# Patient Record
Sex: FEMALE | Race: WHITE | NOT HISPANIC OR LATINO | Employment: FULL TIME | URBAN - METROPOLITAN AREA
[De-identification: names, ages, dates, MRNs, and addresses within clinical notes are randomized per-mention and may not be internally consistent; named-entity substitution may affect disease eponyms.]

---

## 2017-01-10 ENCOUNTER — TELEPHONE (OUTPATIENT)
Dept: HEMATOLOGY/ONCOLOGY | Facility: CLINIC | Age: 47
End: 2017-01-10

## 2017-01-10 NOTE — TELEPHONE ENCOUNTER
----- Message from Conner Lucas MD sent at 1/9/2017  5:02 PM CST -----  Contact: self  No she does not  ----- Message -----     From: Mariaa Silva     Sent: 1/9/2017   2:48 PM       To: Conner Lucas MD        ----- Message -----     From: Catalina Velasco     Sent: 1/9/2017   2:10 PM       To: Shayy Prieto Staff    Pt is calling to speak with a nurse in regards to her having her ovaries removed on 1/23. She is wondering if she will need her injection on 1/19.    Contact number is [phone]    Called pt and she said ok

## 2017-01-12 ENCOUNTER — TELEPHONE (OUTPATIENT)
Dept: HEMATOLOGY/ONCOLOGY | Facility: CLINIC | Age: 47
End: 2017-01-12

## 2017-01-12 NOTE — TELEPHONE ENCOUNTER
----- Message from Catalina Velasco sent at 1/11/2017  4:28 PM CST -----  Contact: self  Pt is calling to speak with a nurse in regards to why her apt on 1/19 was cancelled with  bc she does want to see him.    Contact number is [phone]

## 2017-01-19 ENCOUNTER — OFFICE VISIT (OUTPATIENT)
Dept: RADIATION ONCOLOGY | Facility: CLINIC | Age: 47
End: 2017-01-19
Payer: MEDICAID

## 2017-01-19 ENCOUNTER — HOSPITAL ENCOUNTER (OUTPATIENT)
Dept: RADIOLOGY | Facility: CLINIC | Age: 47
Discharge: HOME | End: 2017-01-19
Attending: INTERNAL MEDICINE
Payer: MEDICAID

## 2017-01-19 VITALS
DIASTOLIC BLOOD PRESSURE: 71 MMHG | HEIGHT: 69 IN | BODY MASS INDEX: 39.32 KG/M2 | HEART RATE: 82 BPM | WEIGHT: 265.5 LBS | TEMPERATURE: 97 F | SYSTOLIC BLOOD PRESSURE: 118 MMHG | RESPIRATION RATE: 14 BRPM

## 2017-01-19 DIAGNOSIS — M85.80 OSTEOPENIA: ICD-10-CM

## 2017-01-19 DIAGNOSIS — C50.912 PRIMARY CANCER OF LEFT BREAST WITH STAGE 3 NODAL METASTASIS PER AMERICAN JOINT COMMITTEE ON CANCER 7TH EDITION (N3): Primary | ICD-10-CM

## 2017-01-19 DIAGNOSIS — C77.9 PRIMARY CANCER OF LEFT BREAST WITH STAGE 3 NODAL METASTASIS PER AMERICAN JOINT COMMITTEE ON CANCER 7TH EDITION (N3): Primary | ICD-10-CM

## 2017-01-19 PROCEDURE — 99213 OFFICE O/P EST LOW 20 MIN: CPT | Mod: PBBFAC | Performed by: RADIOLOGY

## 2017-01-19 PROCEDURE — 77080 DXA BONE DENSITY AXIAL: CPT | Mod: 26,,, | Performed by: INTERNAL MEDICINE

## 2017-01-19 PROCEDURE — 99999 PR PBB SHADOW E&M-EST. PATIENT-LVL III: CPT | Mod: PBBFAC,,, | Performed by: RADIOLOGY

## 2017-01-19 PROCEDURE — 99213 OFFICE O/P EST LOW 20 MIN: CPT | Mod: S$PBB,,, | Performed by: RADIOLOGY

## 2017-01-19 RX ORDER — GABAPENTIN 300 MG/1
300 CAPSULE ORAL EVERY 8 HOURS PRN
Refills: 2 | COMMUNITY
Start: 2016-12-26 | End: 2018-02-23

## 2017-01-19 NOTE — PROGRESS NOTES
REFERRING PHYSICIAN: Brady Baeza MD    DIAGNOSIS:   Multifocal p T1c N2a M0 Invasive ductal carcinoma of the left breast    Radiation Treatment Summary:  Ms. Reese completed radiation to the left breast mound, left internal mammary nodes, and left supra clavicular region as shown below.    Treatment Site Energy Dose/Fx (Gy) #Fx Dose Correction (Gy) Total Dose (Gy) Start Date End Date   LT SCLAV 6X 1.8 28 / 28 0 50.4 10/31/2016 12/9/2016   LT BREAST MOUND/ LT IM 18X/6X 1.8 28 / 28 0 50.4 10/31/2016 12/9/2016     INTERVAL HISTORY:   Ms. Reese is a 46 year old female who was recently diagnosed with left breast cancer after evaluation for serous nipple discharge. On 2/29/2016, she underwent left terminal end nipple duct excision with pathology revealing ductal carcinoma insitu, solid type, grade 1, measuring 9 mm in greatest dimension, with the closest margin at less than 1 mm from the peripheral inked area. On 4/27/2016, she underwent bilateral mastectomy and WAN reconstruction. The pathology revealed the left breast with multifocal invasive ductal carcinoma with the largest lesion measuring 11 mm in the subcutaneous areolar region (additional lesions: 5 mm upper outer quadrant, 3 mm lower inner quadrant). The closest margin is at greater than 10 mm. There was lymphovascular invasion noted. 4 out of 6 sentinel lymph nodes were found to have involvement. On additional axillary dissection, one out of 15 lymph nodes were also involved. This lesion is ER positive (greater than 90%), IA positive (greater than 90%), and HER-2 negative. The pathology from the right breast revealed a 1 mm focus of ADH. She received adjuvant chemotherapy. To reduce her chance of loco regional recurrence, she received post operative radiation to the left breast mound and draining lymph nodes including the left internal mammary and left supraclavicular nodes as above.  She is here today for a routine follow-up.      She's currently on Arimidex  "and is planned for bilateral oophorectomy early next week.  At present, she denies left breast pain, edema, erythema, fever, night sweats, or weight loss.  She reports some mild soreness of the left arm after doing yoga last week.    PHYSICAL EXAMINATION:  VITAL SINGS:   Visit Vitals    /71    Pulse 82    Temp 96.8 °F (36 °C) (Oral)    Resp 14    Ht 5' 9" (1.753 m)    Wt 120.4 kg (265 lb 8 oz)    BMI 39.21 kg/m2     GENERAL: Patient is alert and oriented, in no acute distress.  HEENT: Extraocular muscles are intact.  Oropharynx is clear without lesions.  There is no cervical or supraclavicular adenopathy palpated.    CHEST: Breath sounds clear bilaterally.  No rales.  No rhonchi.  Unlabored respirations.  CARDIOVASCULAR: Normal S1, S2.  Normal rate.  Regular rhythm.  BREAST EXAM: There is hyperpigmentation of the left breast mound noted with some dryness around the nipple area.  The left breast mound is smaller than the right.  Bilateral breast mounds with well-healed scar in the central region secondary to reconstruction. No abnormal masses palpated in the bilateral breast mounds or the bilateral axilla.   ABDOMEN: Bowel sounds normal.  No tenderness.  No abdominal distention.  No hepatomegaly.  No splenomegaly.  EXTREMITIES: No clubbing, cyanosis, edema  NEUROLOGIC: Cranial nerves II through XII are grossly intact.  Sensation is intact.  Strength is 5 out of 5 in the upper and lower extremities bilaterally.    ASSESSMENT:   This is a 46-year-old female with multifocal p T1 CN2 a M0 infiltrating ductal carcinoma of the left breast who underwent bilateral mastectomy and WAN reconstruction with 5 out of 21 lymph nodes positive who completed chemotherapy followed by postoperative radiation, for a lesion which is ER/SD positive and HER-2 negative.    PLAN:   Ms. Reese is recovering from the radiation without any unexpected side effects.  Skin care to the left breast mound was again reviewed with the " patient.  She will continue endocrine therapy as planned.  I plan to see her back in approximately 6 months, unless symptoms warrant otherwise.

## 2017-02-13 ENCOUNTER — TELEPHONE (OUTPATIENT)
Dept: HEMATOLOGY/ONCOLOGY | Facility: CLINIC | Age: 47
End: 2017-02-13

## 2017-02-13 NOTE — TELEPHONE ENCOUNTER
----- Message from Conner Lucas MD sent at 2/13/2017  4:09 PM CST -----  Contact: self  If she wants to be seen, I will be happy to see her  ----- Message -----     From: Mariaa Silva     Sent: 2/13/2017   3:06 PM       To: Conner Lucas MD    Do you want to see her tomorrow   ----- Message -----     From: Severino Valdes     Sent: 2/13/2017   9:56 AM       To: Shayy Prieto Staff    Pt states she has bumps on left breast where cancer was, pt has some concerns and would like to discuss with nurse, pt not sure if she should she  or .  Contact number [phone]

## 2017-02-16 ENCOUNTER — TELEPHONE (OUTPATIENT)
Dept: SURGERY | Facility: CLINIC | Age: 47
End: 2017-02-16

## 2017-02-21 ENCOUNTER — OFFICE VISIT (OUTPATIENT)
Dept: SURGERY | Facility: CLINIC | Age: 47
End: 2017-02-21
Payer: MEDICAID

## 2017-02-21 ENCOUNTER — TELEPHONE (OUTPATIENT)
Dept: SURGERY | Facility: CLINIC | Age: 47
End: 2017-02-21

## 2017-02-21 VITALS — BODY MASS INDEX: 39.97 KG/M2 | TEMPERATURE: 98 F | WEIGHT: 269.88 LBS | HEIGHT: 69 IN

## 2017-02-21 DIAGNOSIS — I89.0 LYMPHEDEMA: Primary | ICD-10-CM

## 2017-02-21 DIAGNOSIS — C77.9 PRIMARY CANCER OF LEFT BREAST WITH STAGE 3 NODAL METASTASIS PER AMERICAN JOINT COMMITTEE ON CANCER 7TH EDITION (N3): ICD-10-CM

## 2017-02-21 DIAGNOSIS — C50.912 PRIMARY CANCER OF LEFT BREAST WITH STAGE 3 NODAL METASTASIS PER AMERICAN JOINT COMMITTEE ON CANCER 7TH EDITION (N3): ICD-10-CM

## 2017-02-21 PROCEDURE — 99999 PR PBB SHADOW E&M-EST. PATIENT-LVL III: CPT | Mod: PBBFAC,,, | Performed by: SURGERY

## 2017-02-21 PROCEDURE — 99213 OFFICE O/P EST LOW 20 MIN: CPT | Mod: PBBFAC,PO | Performed by: SURGERY

## 2017-02-21 PROCEDURE — 99214 OFFICE O/P EST MOD 30 MIN: CPT | Mod: S$PBB,,, | Performed by: SURGERY

## 2017-02-21 RX ORDER — IBUPROFEN 600 MG/1
TABLET ORAL
Refills: 0 | COMMUNITY
Start: 2017-01-23 | End: 2018-02-23

## 2017-02-21 RX ORDER — VENLAFAXINE 75 MG/1
TABLET ORAL
Refills: 0 | COMMUNITY
Start: 2017-01-30 | End: 2018-02-23

## 2017-02-21 NOTE — PROGRESS NOTES
Subjective:       Patient ID: Allegra Reese is a 46 y.o. female.    Chief Complaint: Follow-up (Bumps on left Breast /Hx Left Breast Cancer)    HPI 47 yo W with a history of multifocal pT1c N2a M0 (stage III) infiltrating ductal carcinoma of the left breast who underwent bilateral SSM and WAN reconstruction with 3 lesions with the largest one measuring 11 mm and a total of 5 out of 21 lymph nodes with involvement. This lesion was ER/HI positive and HER-2 negative and she completed adjuvant chemotherapy on October 7, 2016. She completed adjuvant radiation therapy on 12/9/2016.    She presents today with concerns for new palpable superficial skin nodules that she first noticed about 2 weeks ago. Her gynecologist prescribed her topical steroids and since then her skin is much improved and she can barely feel any nodules. She denies any palpable axillary changes. She notes some continued discomfort in her axilla but that is improving. She has noted some swelling of LUE but she continues to do arm exercises. She has not been evaluated by physical therapy yet. She has a follow up with plastic surgery to finish her reconstruction.    Review of Systems   Constitutional: Negative for activity change, appetite change, fever and unexpected weight change.   Respiratory: Negative for apnea, shortness of breath and wheezing.    Cardiovascular: Negative for chest pain and palpitations.   Gastrointestinal: Negative for abdominal distention, abdominal pain, constipation and nausea.   Endocrine: Negative.    Genitourinary: Negative.    Musculoskeletal: Negative.    Skin: Positive for color change and rash.   Neurological: Negative.    Hematological: Negative.        Objective:      Physical Exam   Constitutional: She is oriented to person, place, and time. She appears well-developed and well-nourished. No distress.   HENT:   Head: Normocephalic and atraumatic.   Eyes: EOM are normal. Pupils are equal, round, and reactive to  light.   Cardiovascular: Normal rate, regular rhythm and normal heart sounds.    Pulmonary/Chest: Effort normal and breath sounds normal.       1. Well-healed incisions of reconstructed breasts; well-healed former port site on the R chest wall    2. Firm 2cm nodule in right axillary tail, likely fat necrosis   Abdominal: Soft. Bowel sounds are normal. She exhibits no distension. There is no tenderness.   Musculoskeletal: Normal range of motion.   Neurological: She is alert and oriented to person, place, and time.   Skin: Skin is warm and dry.   Radiation skin changes of the left breast and axilla   Psychiatric: She has a normal mood and affect. Her behavior is normal. Judgment and thought content normal.       Assessment:       1. Primary cancer of left breast with stage 3 nelson metastasis per American Joint Committee on Cancer 7th edition (N3)        Plan:       Follow up in 6 months for clinical breast exam and follow up  Prescription given for outpatient PT  RTC with concerns    Suleman Louise MD  PGY-3  [phone] (pager)    I have personally taken the history and examined this patient and agree with the resident's note as stated above.  SON  No suspicious clinical findings.  Hyperpigmentation of left reconstructed breast and chest wall.  No suspicious masses, skin changes, or LAD.  Right reconstructed breast with about 2 cm area of fat necrosis in UOQ axillary tail.  Pt with slight lymphedema of LUE.  No limited ROM at left shoulder.  Pt pending NAC reconstruction 3 rd stage and NAC tattooing.  Pt with some numbness along left posterior upper arm likely related to ICBN related surgical issues.    SON  Refer to outpatient PT/OT for LUE lymphedema.  F/u with me clinically in 6 months for bilateral clinical chest wall examination as part of ongoing breast CA screening and surveillance.

## 2017-02-21 NOTE — TELEPHONE ENCOUNTER
Spoke with pt, she will call back with the fax number to send OT  Order to [facility] for lymphedema

## 2017-02-21 NOTE — LETTER
[facility]  [address]  [facility] [address]  Phone: [phone] February 21, 2017      Devorah Acosta MD  [address] [facility] [place] [address]    Patient: Allegra Reese   MR Number: [id number]   YOB: 1970   Date of Visit: 2/21/2017     Dear Dr. Acosta:    I saw your patient Allegra Reese in my Breast Surgery Clinic. Below are the relevant portions of my assessment and plan of care.    Allegra is a 46-year-old female with a history of multifocal pT1c N2a M0 (stage III) infiltrating ductal carcinoma of the left breast who underwent bilateral SSM and WAN reconstruction with 3 lesions with the largest one measuring 11 mm and a total of 5 out of 21 lymph nodes with involvement.  This lesion was ER/CT positive and HER-2 negative and she completed adjuvant chemotherapy on October 7, 2016. She completed adjuvant radiation therapy on 12/9/2016.     She presents today with concerns for new palpable superficial skin nodules that she first noticed about 2 weeks ago. Her gynecologist prescribed her topical steroids and since then her skin is much improved and she can barely feel any nodules. She denies any palpable axillary changes. She notes some continued discomfort in her axilla but that is improving. She has noted some swelling of LUE but she continues to do arm exercises. She has not been evaluated by physical therapy yet. She has a follow up with plastic surgery to finish her reconstruction.    SON.  No suspicious clinical findings.  Hyperpigmentation of left reconstructed breast and chest wall.  No suspicious masses, skin changes, or LAD. Right reconstructed breast with about 2 cm area of fat necrosis in UOQ axillary tail.  Patient with slight lymphedema of LUE.  No limited ROM at left shoulder.  Patient pending NAC reconstruction 3rd stage and NAC tattooing.  Patient with some numbness along left posterior upper arm likely related to ICBN related surgical  issues.    Refer to outpatient PT/OT for LUE lymphedema.  F/U with me clinically in 6 months for bilateral clinical chest wall examination as part of ongoing breast CA screening and surveillance.    If you have questions, please do not hesitate to call me. I look forward to following Allegra along with you.    Sincerely,        Brady Baeza MD   Medical Director, [facility]  Section of General and Oncologic Surgery  [facility]    RLC/hcr    CC  MD Maria D Torres MD Alireza Sadeghi, MD

## 2017-02-21 NOTE — TELEPHONE ENCOUNTER
----- Message from Segundo Dukes sent at 2/21/2017  2:14 PM CST -----  Patient states that she needs to speak with nurse in ref to physical therapy order//please call back at [phone]//thank you

## 2017-03-10 ENCOUNTER — OFFICE VISIT (OUTPATIENT)
Dept: HEMATOLOGY/ONCOLOGY | Facility: CLINIC | Age: 47
End: 2017-03-10
Payer: COMMERCIAL

## 2017-03-10 VITALS
DIASTOLIC BLOOD PRESSURE: 90 MMHG | BODY MASS INDEX: 39.39 KG/M2 | HEART RATE: 101 BPM | WEIGHT: 266.75 LBS | SYSTOLIC BLOOD PRESSURE: 138 MMHG | RESPIRATION RATE: 15 BRPM | OXYGEN SATURATION: 100 % | TEMPERATURE: 98 F

## 2017-03-10 DIAGNOSIS — Z79.811 USE OF AROMATASE INHIBITORS: ICD-10-CM

## 2017-03-10 DIAGNOSIS — C50.412 MALIGNANT NEOPLASM OF UPPER-OUTER QUADRANT OF LEFT FEMALE BREAST: Primary | ICD-10-CM

## 2017-03-10 PROCEDURE — 99214 OFFICE O/P EST MOD 30 MIN: CPT | Mod: S$PBB,,, | Performed by: INTERNAL MEDICINE

## 2017-03-10 PROCEDURE — 99213 OFFICE O/P EST LOW 20 MIN: CPT | Mod: PBBFAC | Performed by: INTERNAL MEDICINE

## 2017-03-10 PROCEDURE — 99999 PR PBB SHADOW E&M-EST. PATIENT-LVL III: CPT | Mod: PBBFAC,,, | Performed by: INTERNAL MEDICINE

## 2017-03-10 NOTE — PROGRESS NOTES
Subjective:       Patient ID: Allegra Reese is a 46 y.o. female.    Chief Complaint: No chief complaint on file.    HPI   Mrs. Reese returns today for follow up.  She has now completed her XRT and in late January she underwent a laparoscopic oophorectomy.  She remain on AIs.   Briefly, she is a 46-year-old  female from [place] who was diagnosed with breast cancer last year, and on 04/27/2016 underwent bilateral mastectomies with reconstruction.   There was no invasive cancer or DCIS in the right breast; in the left breast she had three areas of an infiltrating ductal carcinoma measuring 11 mm, 5 mm, and 3 mm respectively.  One sentinel lymph node was positive and a full axillary dissection was performed with a total of five lymph nodes being positive.  She was tested and found to be negative for BRCA mutations.  Of note, her cancer was strongly ER and AR positive and HER 2 megative  She started adjuvant chemotherapy on June 24th, and completed 4 cycles of AC and four cycles of paclitaxel.  XRT was started last fall, and she was also started on zoladex plus anastrazole..      Review of Systems    Overall today she feels OK.  She has fully recovered from her surgery.  Her  ECOG PS remains 1.  She denies any easy bruising, fevers, chills, night  sweats, weight loss, nausea, vomiting, diarrhea, constipation, diplopia, blurred vision, headaches, chest pain, palpitations, shortness of breath, breast pain, upper extremity pain, or difficulty ambulating.  The remainder of the ten-point ROS, including general, skin, lymph, H/N, cardiorespiratory, GI, , Neuro, Endocrine, and psychiatric is negative.   Of note, she underwent removal of her port earlier today.    Objective:      Physical Exam    She is alert, oriented to time, place, person, pleasant, well      nourished, in no acute physical distress.                                    VITAL SIGNS:  Reviewed                                      HEENT:  normal.  There are no nasal, oral, lip, gingival, auricular, lid,    or conjunctival lesions.  Mucosae are moist and pink, and there is no        thrush.  Pupils are equal, reactive to light and accommodation.              Extraocular muscle movements are intact.    Dentition is good.                                     NECK:  Supple without JVD, adenopathy, or thyromegaly.                       LUNGS:  Clear to auscultation without wheezing, rales, or rhonchi.           CARDIOVASCULAR:  Reveals an S1, S2, no murmurs, no rubs, no gallops.         ABDOMEN:  Soft, nontender, without organomegaly.  Bowel sounds are    present.   The abdominal incision has healed.                                                                 EXTREMITIES:  No cyanosis, clubbing, or edema.                               BREASTS:  She is status post bilateral mastectomies with free flap reconstructions.  The incisions have healed nicely.   Her port has been removed.                                   LYMPHATIC:  There is no cervical, axillary, or supraclavicular adenopathy.   SKIN:  Warm and moist, without petechiae, rashes, induration, or ecchymoses.  There is hyperpigmentation within the radiation field on the left chest wall.         NEUROLOGIC:  DTRs are 0-1+ bilaterally, symmetrical, motor function is 5/5,  and cranial nerves are  within normal limits.    Assessment:       1. Malignant neoplasm of upper-outer quadrant of left female breast    2. Use of aromatase inhibitors     3.      Neuropathy, imrpoving  Plan:        Mrs. Reese will remain on AIs through December 2021, and see me again in three months.  She was advised to take calcium and vitamin D for osteoporosis prevention.  Her questions were answered to her satisfaction.

## 2017-03-22 ENCOUNTER — TELEPHONE (OUTPATIENT)
Dept: SURGERY | Facility: CLINIC | Age: 47
End: 2017-03-22

## 2017-03-22 DIAGNOSIS — I89.0 LYMPHEDEMA: Primary | ICD-10-CM

## 2017-03-22 NOTE — TELEPHONE ENCOUNTER
----- Message from Minerva Xavier sent at 3/22/2017  3:48 PM CDT -----  Contact: Self   Allegra States that she needs a call returned by a nurse in reference to her therapy for her arm. Please call Allegra @ [phone]. Thanks :)

## 2017-05-16 ENCOUNTER — TELEPHONE (OUTPATIENT)
Dept: HEMATOLOGY/ONCOLOGY | Facility: CLINIC | Age: 47
End: 2017-05-16

## 2017-06-15 ENCOUNTER — OFFICE VISIT (OUTPATIENT)
Dept: HEMATOLOGY/ONCOLOGY | Facility: CLINIC | Age: 47
End: 2017-06-15
Payer: MEDICAID

## 2017-06-15 DIAGNOSIS — D05.12 DUCTAL CARCINOMA IN SITU (DCIS) OF LEFT BREAST: ICD-10-CM

## 2017-06-15 DIAGNOSIS — Z79.811 USE OF AROMATASE INHIBITORS: ICD-10-CM

## 2017-06-15 DIAGNOSIS — I26.99 OTHER ACUTE PULMONARY EMBOLISM: ICD-10-CM

## 2017-06-15 DIAGNOSIS — C50.912 MALIGNANT NEOPLASM OF LEFT FEMALE BREAST, UNSPECIFIED SITE OF BREAST: Primary | ICD-10-CM

## 2017-06-15 PROCEDURE — 99999 PR PBB SHADOW E&M-EST. PATIENT-LVL I: CPT | Mod: PBBFAC,,, | Performed by: INTERNAL MEDICINE

## 2017-06-15 PROCEDURE — 99215 OFFICE O/P EST HI 40 MIN: CPT | Mod: S$GLB,,, | Performed by: INTERNAL MEDICINE

## 2017-06-15 PROCEDURE — 99211 OFF/OP EST MAY X REQ PHY/QHP: CPT | Mod: PBBFAC | Performed by: INTERNAL MEDICINE

## 2017-06-15 NOTE — PROGRESS NOTES
Subjective:       Patient ID: Allegra Reese is a 47 y.o. female.    Chief Complaint: No chief complaint on file.    HPI   Mrs. Reese returns today for follow up.  She has now completed her XRT and in late January she underwent a laparoscopic oophorectomy.  She remain on AIs.   Briefly, she is a 46-year-old  female from [place] who was diagnosed with breast cancer last year, and on 04/27/2016 underwent bilateral mastectomies with reconstruction.   There was no invasive cancer or DCIS in the right breast; in the left breast she had three areas of an infiltrating ductal carcinoma measuring 11 mm, 5 mm, and 3 mm respectively.  One sentinel lymph node was positive and a full axillary dissection was performed with a total of five lymph nodes being positive.  She was tested and found to be negative for BRCA mutations.  Of note, her cancer was strongly ER and LA positive and HER 2 megative  She started adjuvant chemotherapy on June 24th, and completed 4 cycles of AC and four cycles of paclitaxel.  XRT was started last fall, and she was also started on zoladex plus anastrazole..    Apparently she underwent a reconstruction on April 29, 2017, and two weeks alter she presented to the ED with SOB and was found to have PEs.  She was started on rivaroxaban, and was told to take it for 6 months.  She is currently on 20 mg QHS.       Review of Systems    Overall today she feels OK.  She has fully recovered from her surgery and her PE.  Her  ECOG PS remains 1.  She does complain of joint pains, presumably from the anastrazole, but she denies any easy bruising, fevers, chills, night  sweats, weight loss, nausea, vomiting, diarrhea, constipation, diplopia, blurred vision, headaches, chest pain, palpitations, shortness of breath, breast pain, upper extremity pain, or difficulty ambulating.  The remainder of the ten-point ROS, including general, skin, lymph, H/N, cardiorespiratory, GI, , Neuro, Endocrine, and  psychiatric is negative.   Of note, she underwent removal of her port earlier today.    Objective:      Physical Exam    She is alert, oriented to time, place, person, pleasant, well      nourished, in no acute physical distress.                                    VITAL SIGNS:  Reviewed                                      HEENT: normal.  There are no nasal, oral, lip, gingival, auricular, lid,    or conjunctival lesions.  Mucosae are moist and pink, and there is no        thrush.  Pupils are equal, reactive to light and accommodation.              Extraocular muscle movements are intact.    Dentition is good.                                     NECK:  Supple without JVD, adenopathy, or thyromegaly.                       LUNGS:  Clear to auscultation without wheezing, rales, or rhonchi.           CARDIOVASCULAR:  Reveals an S1, S2, no murmurs, no rubs, no gallops.         ABDOMEN:  Soft, nontender, without organomegaly.  Bowel sounds are    present.   The abdominal incision has healed.                                                                 EXTREMITIES:  No cyanosis, clubbing, or edema.                               BREASTS:  She is status post bilateral mastectomies with free flap reconstructions.  The incisions have healed nicely.                                LYMPHATIC:  There is no cervical, axillary, or supraclavicular adenopathy.   SKIN:  Warm and moist, without petechiae, rashes, induration, or ecchymoses.  There is hyperpigmentation within the radiation field on the left chest wall.         NEUROLOGIC:  DTRs are 0-1+ bilaterally, symmetrical, motor function is 5/5,  and cranial nerves are  within normal limits.    Assessment:       1. Malignant neoplasm of left female breast, unspecified site of breast    2. Ductal carcinoma in situ (DCIS) of left breast    3. Use of aromatase inhibitors     3.      PE, possibly related to the administration of AIs.   Plan:        Mrs. Reese will remain on AIs  through December 2021, and see me again in three months.  In regards to her recent PEs, I recommended that she remain on rivaroxaban for the whole time that she is on AIs.   Her questions were answered to her satisfaction.

## 2017-08-23 ENCOUNTER — OFFICE VISIT (OUTPATIENT)
Dept: RADIATION ONCOLOGY | Facility: CLINIC | Age: 47
End: 2017-08-23
Payer: COMMERCIAL

## 2017-08-23 VITALS
WEIGHT: 258.31 LBS | HEIGHT: 69 IN | SYSTOLIC BLOOD PRESSURE: 134 MMHG | BODY MASS INDEX: 38.26 KG/M2 | HEART RATE: 79 BPM | RESPIRATION RATE: 16 BRPM | TEMPERATURE: 98 F | DIASTOLIC BLOOD PRESSURE: 73 MMHG

## 2017-08-23 DIAGNOSIS — Z17.0 MALIGNANT NEOPLASM OF AREOLA OF LEFT BREAST IN FEMALE, ESTROGEN RECEPTOR POSITIVE: Primary | ICD-10-CM

## 2017-08-23 DIAGNOSIS — C50.012 MALIGNANT NEOPLASM OF AREOLA OF LEFT BREAST IN FEMALE, ESTROGEN RECEPTOR POSITIVE: Primary | ICD-10-CM

## 2017-08-23 PROCEDURE — 3075F SYST BP GE 130 - 139MM HG: CPT | Mod: S$GLB,,, | Performed by: RADIOLOGY

## 2017-08-23 PROCEDURE — 3008F BODY MASS INDEX DOCD: CPT | Mod: S$GLB,,, | Performed by: RADIOLOGY

## 2017-08-23 PROCEDURE — 3078F DIAST BP <80 MM HG: CPT | Mod: S$GLB,,, | Performed by: RADIOLOGY

## 2017-08-23 PROCEDURE — 99213 OFFICE O/P EST LOW 20 MIN: CPT | Mod: S$GLB,,, | Performed by: RADIOLOGY

## 2017-08-23 PROCEDURE — 99999 PR PBB SHADOW E&M-EST. PATIENT-LVL III: CPT | Mod: PBBFAC,,, | Performed by: RADIOLOGY

## 2017-08-23 RX ORDER — METOPROLOL SUCCINATE 25 MG/1
TABLET, EXTENDED RELEASE ORAL
Refills: 6 | COMMUNITY
Start: 2017-08-08

## 2017-08-23 RX ORDER — RIVAROXABAN 20 MG/1
20 TABLET, FILM COATED ORAL DAILY
Refills: 12 | COMMUNITY
Start: 2017-08-15

## 2017-08-23 RX ORDER — PYRIDOXINE HCL (VITAMIN B6) 100 MG
100 TABLET ORAL DAILY
COMMUNITY
End: 2018-02-23

## 2017-08-23 NOTE — PROGRESS NOTES
REFERRING PHYSICIAN: Brady Baeza MD     DIAGNOSIS:   Multifocal p T1c N2a M0 Invasive ductal carcinoma of the left breast     Radiation Treatment Summary:  Ms. Reese completed radiation to the left breast mound, left internal mammary nodes, and left supra clavicular region as shown below.     Treatment Site Energy Dose/Fx (Gy) #Fx Total Dose (Gy) Start Date End Date   LT SCLAV 6X 1.8 28 / 28 50.4 10/31/2016 12/9/2016   LT BREAST MOUND/ LT IM 18X/6X 1.8 28 / 28 50.4 10/31/2016 12/9/2016      INTERVAL HISTORY:   Ms. Reese is a 47 year old female who was recently diagnosed with left breast cancer after evaluation for serous nipple discharge. On 2/29/2016, she underwent left terminal end nipple duct excision with pathology revealing ductal carcinoma insitu, solid type, grade 1, measuring 9 mm in greatest dimension, with the closest margin at less than 1 mm from the peripheral inked area. On 4/27/2016, she underwent bilateral mastectomy and WAN reconstruction. The pathology revealed the left breast with multifocal invasive ductal carcinoma with the largest lesion measuring 11 mm in the subcutaneous areolar region (additional lesions: 5 mm upper outer quadrant, 3 mm lower inner quadrant). The closest margin is at greater than 10 mm. There was lymphovascular invasion noted. 4 out of 6 sentinel lymph nodes were found to have involvement. On additional axillary dissection, one out of 15 lymph nodes were also involved. This lesion is ER positive (greater than 90%), [place] positive (greater than 90%), and HER-2 negative. The pathology from the right breast revealed a 1 mm focus of ADH. She received adjuvant chemotherapy. To reduce her chance of loco regional recurrence, she received post operative radiation to the left breast mound and draining lymph nodes including the left internal mammary and left supraclavicular nodes as above.  She is here today for a routine follow-up.       Since I saw her last, she underwent bilateral  "oophorectomy and revision of the bilateral breast mounds.  She was also diagnosed with PE and is currently on anticoagulation.  She's currently on Arimidex which she is tolerating.  At present, she denies left breast pain, edema, erythema, fever, night sweats, or weight loss.  .     PHYSICAL EXAMINATION:  VITAL SINGS: /73   Pulse 79   Temp 97.7 °F (36.5 °C) (Oral)   Resp 16   Ht 5' 9" (1.753 m)   Wt 117.2 kg (258 lb 4.8 oz)   BMI 38.14 kg/m²   GENERAL: Patient is alert and oriented, in no acute distress.  HEENT: Extraocular muscles are intact.  Oropharynx is clear without lesions.  There is no cervical or supraclavicular adenopathy palpated.    CHEST: Breath sounds clear bilaterally.  No rales.  No rhonchi.  Unlabored respirations.  CARDIOVASCULAR: Normal S1, S2.  Normal rate.  Regular rhythm.  BREAST EXAM: There is hyperpigmentation of the left breast mound noted with some dryness around the nipple area.  The left breast mound is smaller than the right.  Bilateral breast mounds with well-healed scar in the central region secondary to reconstruction. No abnormal masses palpated in the bilateral breast mounds or the bilateral axilla.   ABDOMEN: Bowel sounds normal.  No tenderness.  No abdominal distention.  No hepatomegaly.  No splenomegaly.  EXTREMITIES: No clubbing, cyanosis, edema  NEUROLOGIC: Cranial nerves II through XII are grossly intact.  Sensation is intact.  Strength is 5 out of 5 in the upper and lower extremities bilaterally.    ASSESSMENT:   This is a 47-year-old female with multifocal p T1 CN2 a M0 infiltrating ductal carcinoma of the left breast who underwent bilateral mastectomy and WAN reconstruction with 5 out of 21 lymph nodes positive who completed chemotherapy followed by postoperative radiation, for a lesion which is ER/CA positive and HER-2 negative.     PLAN:   Ms. Reese is doing well from a standpoint of left breast cancer.  She will continue endocrine therapy as planned per Dr." Jie.  I plan to see her back as needed, unless symptoms warrant otherwise.

## 2017-09-19 ENCOUNTER — OFFICE VISIT (OUTPATIENT)
Dept: SURGERY | Facility: CLINIC | Age: 47
End: 2017-09-19
Payer: COMMERCIAL

## 2017-09-19 VITALS
WEIGHT: 257.81 LBS | HEIGHT: 69 IN | BODY MASS INDEX: 38.18 KG/M2 | HEART RATE: 79 BPM | DIASTOLIC BLOOD PRESSURE: 89 MMHG | SYSTOLIC BLOOD PRESSURE: 132 MMHG | TEMPERATURE: 99 F

## 2017-09-19 DIAGNOSIS — C50.912 PRIMARY CANCER OF LEFT BREAST WITH STAGE 3 NODAL METASTASIS PER AMERICAN JOINT COMMITTEE ON CANCER 7TH EDITION (N3): Primary | ICD-10-CM

## 2017-09-19 DIAGNOSIS — C77.9 PRIMARY CANCER OF LEFT BREAST WITH STAGE 3 NODAL METASTASIS PER AMERICAN JOINT COMMITTEE ON CANCER 7TH EDITION (N3): Primary | ICD-10-CM

## 2017-09-19 PROCEDURE — 99999 PR PBB SHADOW E&M-EST. PATIENT-LVL III: CPT | Mod: PBBFAC,,, | Performed by: SURGERY

## 2017-09-19 PROCEDURE — 3008F BODY MASS INDEX DOCD: CPT | Mod: S$GLB,,, | Performed by: SURGERY

## 2017-09-19 PROCEDURE — 3075F SYST BP GE 130 - 139MM HG: CPT | Mod: S$GLB,,, | Performed by: SURGERY

## 2017-09-19 PROCEDURE — 3079F DIAST BP 80-89 MM HG: CPT | Mod: S$GLB,,, | Performed by: SURGERY

## 2017-09-19 PROCEDURE — 99214 OFFICE O/P EST MOD 30 MIN: CPT | Mod: S$GLB,,, | Performed by: SURGERY
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
## 2017-09-25 NOTE — PROGRESS NOTES
Subjective:       Patient ID: Allegra Reese is a 47 y.o. female.    Chief Complaint: No chief complaint on file.    HPI   Mrs. Reese returns today for follow up.  She completed her XRT and in late January 2017 she underwent a laparoscopic oophorectomy.  She remain on AIs.   Briefly, she is a 47-year-old  female from [place] who was diagnosed with breast cancer last year, and on 04/27/2016 underwent bilateral mastectomies with reconstruction.   There was no invasive cancer or DCIS in the right breast; in the left breast she had three areas of an infiltrating ductal carcinoma measuring 11 mm, 5 mm, and 3 mm respectively.  One sentinel lymph node was positive and a full axillary dissection was performed with a total of five lymph nodes being positive.  She was tested and found to be negative for BRCA mutations.  Of note, her cancer was strongly ER and IA positive and HER 2 megative  She started adjuvant chemotherapy on June 24th, and completed 4 cycles of AC and four cycles of paclitaxel.  XRT was started last fall, and she was also started on zoladex plus anastrazole..    Apparently she underwent a reconstruction on April 29, 2017, and two weeks alter she presented to the ED with SOB and was found to have PEs.  She was started on rivaroxaban, and is currently on 20 mg QHS.       Review of Systems    Overall today she feels OK.  She has fully recovered from her surgery and her PE.  Her  ECOG PS remains 1.  She does complain of joint pains, presumably from the anastrazole, but she denies any easy bruising, fevers, chills, night  sweats, weight loss, nausea, vomiting, diarrhea, constipation, diplopia, blurred vision, headaches, chest pain, palpitations, shortness of breath, breast pain, upper extremity pain, or difficulty ambulating.  The remainder of the ten-point ROS, including general, skin, lymph, H/N, cardiorespiratory, GI, , Neuro, Endocrine, and psychiatric is negative.   Of note, she underwent  removal of her port earlier today.    Objective:      Physical Exam    She is alert, oriented to time, place, person, pleasant, well      nourished, in no acute physical distress.                                    VITAL SIGNS:  Reviewed                                      HEENT: normal.  There are no nasal, oral, lip, gingival, auricular, lid,    or conjunctival lesions.  Mucosae are moist and pink, and there is no        thrush.  Pupils are equal, reactive to light and accommodation.              Extraocular muscle movements are intact.    Dentition is good.                                     NECK:  Supple without JVD, adenopathy, or thyromegaly.                       LUNGS:  Clear to auscultation without wheezing, rales, or rhonchi.           CARDIOVASCULAR:  Reveals an S1, S2, no murmurs, no rubs, no gallops.         ABDOMEN:  Soft, nontender, without organomegaly.  Bowel sounds are    present.   The abdominal incision has healed.                                                                 EXTREMITIES:  No cyanosis, clubbing, or edema.                               BREASTS:  She is status post bilateral mastectomies with free flap reconstructions.  The incisions have healed nicely.  There is a 2 cm hard nodule at the 9 o' clock position in the right reconstructed breast, possibly representing fat necrosis.                            LYMPHATIC:  There is no cervical, axillary, or supraclavicular adenopathy.   SKIN:  Warm and moist, without petechiae, rashes, induration, or ecchymoses.  There is mild hyperpigmentation within the radiation field on the left chest wall.         NEUROLOGIC:  DTRs are 0-1+ bilaterally, symmetrical, motor function is 5/5,  and cranial nerves are  within normal limits.    Assessment:       1. Ductal carcinoma in situ (DCIS) of breast, unspecified laterality    2. Malignant neoplasm of upper-outer quadrant of left breast in female, estrogen receptor positive    3. Use of aromatase  inhibitors     3.     PE, possibly related to the administration of AIs.   5.      Probable fat necrosis, right reconstructed breast  Plan:        In regards to her breast nodule, I suspect it is fat necrosis.  However, out of abundance of caution we will proceed with a right sided mammogram.  Mrs. Reese will remain on AIs through December 2021, and see me again in three months.  In regards to her recent PE, I recommended that she remain on rivaroxaban for the whole time that she is on AIs.   Her questions were answered to her satisfaction.

## 2017-09-26 ENCOUNTER — HOSPITAL ENCOUNTER (OUTPATIENT)
Dept: RADIOLOGY | Facility: HOSPITAL | Age: 47
Discharge: HOME | End: 2017-09-26
Attending: INTERNAL MEDICINE
Payer: COMMERCIAL

## 2017-09-26 ENCOUNTER — OFFICE VISIT (OUTPATIENT)
Dept: SURGERY | Facility: CLINIC | Age: 47
End: 2017-09-26
Payer: COMMERCIAL

## 2017-09-26 VITALS
WEIGHT: 257 LBS | DIASTOLIC BLOOD PRESSURE: 93 MMHG | TEMPERATURE: 98 F | HEIGHT: 69 IN | SYSTOLIC BLOOD PRESSURE: 133 MMHG | BODY MASS INDEX: 38.06 KG/M2 | HEART RATE: 95 BPM

## 2017-09-26 DIAGNOSIS — Z17.0 MALIGNANT NEOPLASM OF UPPER-OUTER QUADRANT OF LEFT BREAST IN FEMALE, ESTROGEN RECEPTOR POSITIVE: ICD-10-CM

## 2017-09-26 DIAGNOSIS — C50.412 MALIGNANT NEOPLASM OF UPPER-OUTER QUADRANT OF LEFT BREAST IN FEMALE, ESTROGEN RECEPTOR POSITIVE: ICD-10-CM

## 2017-09-26 DIAGNOSIS — Z79.811 USE OF AROMATASE INHIBITORS: ICD-10-CM

## 2017-09-26 DIAGNOSIS — D05.10 DUCTAL CARCINOMA IN SITU (DCIS) OF BREAST, UNSPECIFIED LATERALITY: Primary | ICD-10-CM

## 2017-09-26 DIAGNOSIS — D05.10 DUCTAL CARCINOMA IN SITU (DCIS) OF BREAST, UNSPECIFIED LATERALITY: ICD-10-CM

## 2017-09-26 PROCEDURE — 3008F BODY MASS INDEX DOCD: CPT | Mod: S$GLB,,, | Performed by: INTERNAL MEDICINE

## 2017-09-26 PROCEDURE — 3080F DIAST BP >= 90 MM HG: CPT | Mod: S$GLB,,, | Performed by: INTERNAL MEDICINE

## 2017-09-26 PROCEDURE — 99214 OFFICE O/P EST MOD 30 MIN: CPT | Mod: S$GLB,,, | Performed by: INTERNAL MEDICINE

## 2017-09-26 PROCEDURE — 3075F SYST BP GE 130 - 139MM HG: CPT | Mod: S$GLB,,, | Performed by: INTERNAL MEDICINE

## 2017-09-26 PROCEDURE — 99999 PR PBB SHADOW E&M-EST. PATIENT-LVL IV: CPT | Mod: PBBFAC,,, | Performed by: INTERNAL MEDICINE

## 2017-09-26 PROCEDURE — 77065 DX MAMMO INCL CAD UNI: CPT | Mod: 26,,, | Performed by: RADIOLOGY

## 2017-09-26 PROCEDURE — 77061 BREAST TOMOSYNTHESIS UNI: CPT | Mod: TC

## 2017-09-26 PROCEDURE — 77061 BREAST TOMOSYNTHESIS UNI: CPT | Mod: 26,,, | Performed by: RADIOLOGY

## 2017-09-26 RX ORDER — CEPHALEXIN 500 MG/1
CAPSULE ORAL
Refills: 0 | COMMUNITY
Start: 2017-09-07 | End: 2018-02-23

## 2017-09-26 RX ORDER — SULFAMETHOXAZOLE AND TRIMETHOPRIM 800; 160 MG/1; MG/1
TABLET ORAL
Refills: 0 | COMMUNITY
Start: 2017-09-22 | End: 2018-02-23

## 2017-10-27 ENCOUNTER — TELEPHONE (OUTPATIENT)
Dept: HEMATOLOGY/ONCOLOGY | Facility: CLINIC | Age: 47
End: 2017-10-27

## 2017-10-27 NOTE — TELEPHONE ENCOUNTER
Returned call, spoke with patient and assisted with scheduling a f/u apt. Patient stated she was recently in the ER and was told to schedule a hospital f/u

## 2017-10-27 NOTE — TELEPHONE ENCOUNTER
----- Message from Joselo Valdes sent at 10/26/2017 11:56 AM CDT -----  Contact: Pt   Pt will like to schedule an appt w/ Dr. Lloyd regarding a certain issue     Contact::[phone]

## 2017-10-31 ENCOUNTER — OFFICE VISIT (OUTPATIENT)
Dept: HEMATOLOGY/ONCOLOGY | Facility: CLINIC | Age: 47
End: 2017-10-31
Payer: COMMERCIAL

## 2017-10-31 VITALS
WEIGHT: 260 LBS | SYSTOLIC BLOOD PRESSURE: 178 MMHG | HEART RATE: 99 BPM | BODY MASS INDEX: 41.78 KG/M2 | DIASTOLIC BLOOD PRESSURE: 100 MMHG | TEMPERATURE: 98 F | RESPIRATION RATE: 18 BRPM | HEIGHT: 66 IN

## 2017-10-31 DIAGNOSIS — Z17.0 MALIGNANT NEOPLASM OF NIPPLE OF LEFT BREAST IN FEMALE, ESTROGEN RECEPTOR POSITIVE: Primary | ICD-10-CM

## 2017-10-31 DIAGNOSIS — Z79.811 USE OF AROMATASE INHIBITORS: ICD-10-CM

## 2017-10-31 DIAGNOSIS — C50.012 MALIGNANT NEOPLASM OF NIPPLE OF LEFT BREAST IN FEMALE, ESTROGEN RECEPTOR POSITIVE: Primary | ICD-10-CM

## 2017-10-31 PROCEDURE — 99214 OFFICE O/P EST MOD 30 MIN: CPT | Mod: S$GLB,,, | Performed by: INTERNAL MEDICINE

## 2017-10-31 PROCEDURE — 99999 PR PBB SHADOW E&M-EST. PATIENT-LVL I: CPT | Mod: PBBFAC,,, | Performed by: INTERNAL MEDICINE

## 2017-10-31 NOTE — PROGRESS NOTES
Subjective:       Patient ID: Allegra Reese is a 47 y.o. female.    Chief Complaint: No chief complaint on file.    HPI   Mrs. Reese returns today for follow up.  She completed her XRT and in late January 2017 she underwent a laparoscopic oophorectomy.  She remain on AIs.   Briefly, she is a 47-year-old  female from [place] who was diagnosed with breast cancer last year, and on 04/27/2016 underwent bilateral mastectomies with reconstruction.   There was no invasive cancer or DCIS in the right breast; in the left breast she had three areas of an infiltrating ductal carcinoma measuring 11 mm, 5 mm, and 3 mm respectively.  One sentinel lymph node was positive and a full axillary dissection was performed with a total of five lymph nodes being positive.  She was tested and found to be negative for BRCA mutations.  Of note, her cancer was strongly ER and SC positive and HER 2 megative  She started adjuvant chemotherapy on June 24th, and completed 4 cycles of AC and four cycles of paclitaxel.  XRT was started last fall, and she was also started on zoladex plus anastrazole..    Apparently she underwent a reconstruction on April 29, 2017, and two weeks alter she presented to the ED with SOB and was found to have PEs.  She was started on rivaroxaban, and is currently on 20 mg QHS.       Review of Systems    Overall today she feels OK.  She has fully recovered from her surgery and her PE.  Her  ECOG PS remains 1.  She again complains of joint pains, involving primarily her thumbs and her hands, presumably from the anastrazole, but she denies any easy bruising, fevers, chills, night  sweats, weight loss, nausea, vomiting, diarrhea, constipation, diplopia, blurred vision, headaches, chest pain, palpitations, shortness of breath, breast pain, upper extremity pain, or difficulty ambulating.  The remainder of the ten-point ROS, including general, skin, lymph, H/N, cardiorespiratory, GI, , Neuro, Endocrine, and  psychiatric is negative.   Of note, she underwent removal of her port earlier today.    Objective:      Physical Exam    She is alert, oriented to time, place, person, pleasant, well      nourished, in no acute physical distress.                                    VITAL SIGNS:  Reviewed                                      HEENT: normal.  There are no nasal, oral, lip, gingival, auricular, lid,    or conjunctival lesions.  Mucosae are moist and pink, and there is no        thrush.  Pupils are equal, reactive to light and accommodation.              Extraocular muscle movements are intact.    Dentition is good.                                     NECK:  Supple without JVD, adenopathy, or thyromegaly.                       LUNGS:  Clear to auscultation without wheezing, rales, or rhonchi.           CARDIOVASCULAR:  Reveals an S1, S2, no murmurs, no rubs, no gallops.         ABDOMEN:  Soft, nontender, without organomegaly.  Bowel sounds are    present.   The abdominal incision has healed.                                                                 EXTREMITIES:  No cyanosis, clubbing, or edema.                               BREASTS:  She is status post bilateral mastectomies with free flap reconstructions.  The incisions have healed nicely.  There is a 2 cm hard nodule at the 9 o' clock position in the right reconstructed breast, representing fat necrosis.                            LYMPHATIC:  There is no cervical, axillary, or supraclavicular adenopathy.   SKIN:  Warm and moist, without petechiae, rashes, induration, or ecchymoses.  There is mild hyperpigmentation within the radiation field on the left chest wall.         NEUROLOGIC:  DTRs are 0-1+ bilaterally, symmetrical, motor function is 5/5,  and cranial nerves are  within normal limits.    Assessment:       1. Malignant neoplasm of nipple of left breast in female, estrogen receptor positive    2. Use of aromatase inhibitors     3.     PE, possibly related to  the administration of AIs.   4.      Fat necrosis, right reconstructed breast\  5.      Thumb and hand joint pains bilaterally secondary to AIs.   Plan:          I had a long discussion with her; we discussed the options of examestane and also of tamoxifen.  The pros and cons of both of the above agents were discussed in detail.  At the end, she elected to remain on anastrazole for now.  She will complete her 5 years in December 2021.  RTC 3 months.  Her multiple questions were answered to her satisfaction.  In regards to her recent PE, I recommended that she remain on rivaroxaban for the whole time that she is on AIs.   Her questions were answered to her satisfaction.

## 2017-12-07 DIAGNOSIS — Z79.811 USE OF AROMATASE INHIBITORS: ICD-10-CM

## 2017-12-07 RX ORDER — ANASTROZOLE 1 MG/1
TABLET ORAL
Qty: 90 TABLET | Refills: 0 | Status: CANCELLED | OUTPATIENT
Start: 2017-12-07

## 2017-12-09 DIAGNOSIS — Z79.811 USE OF AROMATASE INHIBITORS: ICD-10-CM

## 2017-12-09 RX ORDER — ANASTROZOLE 1 MG/1
1 TABLET ORAL DAILY
Qty: 90 TABLET | Refills: 2 | Status: SHIPPED | OUTPATIENT
Start: 2017-12-09 | End: 2018-09-26 | Stop reason: SDUPTHER

## 2017-12-11 DIAGNOSIS — Z79.811 USE OF AROMATASE INHIBITORS: ICD-10-CM

## 2017-12-11 RX ORDER — ANASTROZOLE 1 MG/1
1 TABLET ORAL DAILY
Qty: 90 TABLET | Refills: 2 | OUTPATIENT
Start: 2017-12-11 | End: 2018-12-11

## 2018-01-19 ENCOUNTER — OFFICE VISIT (OUTPATIENT)
Dept: URGENT CARE | Facility: CLINIC | Age: 48
End: 2018-01-19
Payer: MEDICAID

## 2018-01-19 VITALS
BODY MASS INDEX: 39.55 KG/M2 | WEIGHT: 267 LBS | RESPIRATION RATE: 20 BRPM | HEIGHT: 69 IN | SYSTOLIC BLOOD PRESSURE: 141 MMHG | HEART RATE: 86 BPM | OXYGEN SATURATION: 97 % | TEMPERATURE: 98 F | DIASTOLIC BLOOD PRESSURE: 97 MMHG

## 2018-01-19 DIAGNOSIS — M77.32 HEEL SPUR, LEFT: Primary | ICD-10-CM

## 2018-01-19 DIAGNOSIS — M79.672 LEFT FOOT PAIN: ICD-10-CM

## 2018-01-19 PROCEDURE — 99204 OFFICE O/P NEW MOD 45 MIN: CPT | Mod: S$GLB,,, | Performed by: FAMILY MEDICINE

## 2018-01-19 RX ORDER — TRAMADOL HYDROCHLORIDE 50 MG/1
50 TABLET ORAL EVERY 6 HOURS PRN
Qty: 20 TABLET | Refills: 0 | Status: SHIPPED | OUTPATIENT
Start: 2018-01-19 | End: 2019-01-19

## 2018-01-19 NOTE — PROGRESS NOTES
"Subjective:       Patient ID: Allegra Reese is a 47 y.o. female.    Vitals:  height is 5' 9" (1.753 m) and weight is 121.1 kg (267 lb). Her oral temperature is 98 °F (36.7 °C). Her blood pressure is 141/97 (abnormal) and her pulse is 86. Her respiration is 20 and oxygen saturation is 97%.     Chief Complaint: Foot Pain    Foot Pain   This is a new problem. The current episode started in the past 7 days. The problem occurs constantly. The problem has been unchanged. Associated symptoms include joint swelling. Pertinent negatives include no abdominal pain, chest pain, neck pain, numbness or weakness. Nothing aggravates the symptoms. She has tried nothing for the symptoms.     Review of Systems   Constitution: Negative for weakness and malaise/fatigue.   HENT: Negative for nosebleeds.    Cardiovascular: Negative for chest pain and syncope.   Respiratory: Negative for shortness of breath.    Musculoskeletal: Positive for joint pain and joint swelling. Negative for back pain and neck pain.   Gastrointestinal: Negative for abdominal pain.   Genitourinary: Negative for hematuria.   Neurological: Negative for dizziness and numbness.       Objective:      Physical Exam   Constitutional: She is oriented to person, place, and time. She appears well-developed and well-nourished. She is cooperative.  Non-toxic appearance. She does not appear ill. No distress.   HENT:   Head: Normocephalic and atraumatic. Head is without abrasion, without contusion and without laceration.   Right Ear: Hearing, tympanic membrane, external ear and ear canal normal. No hemotympanum.   Left Ear: Hearing, tympanic membrane, external ear and ear canal normal. No hemotympanum.   Nose: Nose normal. No mucosal edema, rhinorrhea or nasal deformity. No epistaxis. Right sinus exhibits no maxillary sinus tenderness and no frontal sinus tenderness. Left sinus exhibits no maxillary sinus tenderness and no frontal sinus tenderness.   Mouth/Throat: Uvula " is midline, oropharynx is clear and moist and mucous membranes are normal. No trismus in the jaw. Normal dentition. No uvula swelling. No posterior oropharyngeal erythema.   Eyes: Conjunctivae, EOM and lids are normal. Pupils are equal, round, and reactive to light. Right eye exhibits no discharge. Left eye exhibits no discharge. No scleral icterus.   Sclera clear bilat   Neck: Trachea normal, normal range of motion, full passive range of motion without pain and phonation normal. Neck supple. No spinous process tenderness and no muscular tenderness present. No neck rigidity. No tracheal deviation present.   Cardiovascular: Normal rate, regular rhythm, normal heart sounds, intact distal pulses and normal pulses.    Pulmonary/Chest: Effort normal and breath sounds normal. No respiratory distress.   Abdominal: Soft. Normal appearance and bowel sounds are normal. She exhibits no distension, no pulsatile midline mass and no mass. There is no tenderness.   Musculoskeletal: Normal range of motion. She exhibits no edema or deformity.        Left foot: There is tenderness and bony tenderness.        Feet:    Neurological: She is alert and oriented to person, place, and time. She has normal strength. No cranial nerve deficit or sensory deficit. She exhibits normal muscle tone. She displays no seizure activity. Coordination normal. GCS eye subscore is 4. GCS verbal subscore is 5. GCS motor subscore is 6.   Skin: Skin is warm, dry and intact. Capillary refill takes less than 2 seconds. No abrasion, no bruising, no burn, no ecchymosis and no laceration noted. She is not diaphoretic. No pallor.   Psychiatric: She has a normal mood and affect. Her speech is normal and behavior is normal. Judgment and thought content normal. Cognition and memory are normal.   Nursing note and vitals reviewed.    Type of Interpretation: ED Physician (Independently Interpreted).  Radiology Procedure Done: Left Foot.  Interpretation: Heel spur,  otherwise wnl.      Assessment:       1. Heel spur, left    2. Left foot pain        Plan:         Heel spur, left    Left foot pain  -     X-Ray Foot Complete Left; Future; Expected date: 01/19/2018    Other orders  -     traMADol (ULTRAM) 50 mg tablet; Take 1 tablet (50 mg total) by mouth every 6 (six) hours as needed for Pain.  Dispense: 20 tablet; Refill: 0      Please drink plenty of fluids.  Please get plenty of rest.  Please return here or go to the Emergency Department for any concerns or worsening of condition.    If you were prescribed a narcotic medication, do not drive or operate heavy equipment or machinery while taking these medications.    Use a Dr. Gary's pad with heel area removed to relieve pressure on heel.  If crutches were recommended, use them for touch down ambulation to take weight off the affected foot.      If you were not prescribed an anti-inflammatory medication, and if you do not have any history of stomach/intestinal ulcers, or kidney disease, or are not taking a blood thinner such as Coumadin, Plavix, Pradaxa, Eloquis, or Xaralta for example, it is OK to take over the counter Ibuprofen or Advil or Motrin or Aleve as directed.  Do not take these medications on an empty stomach.  If not allergic, please take over the counter Tylenol (Acetaminophen) and/or Motrin (Ibuprofen) as directed for control of pain and/or fever.    Please follow up with your primary care doctor or specialist as needed.  Devorah Acosta MD  [phone]    Podiatry - Carmelina Combs  [address] [place] [address].  Kasandra Cosme  [id number]  [phone]

## 2018-01-19 NOTE — LETTER
January 19, 2018  Allegra Reese  [address] [place] [id number]                [facility]  [address], [address]  [facility] [address]  Phone: [phone]  Fax: [phone] Allegra Reese was seen and treated in our Urgent Care department   on 1/19/2018. She may return to work in 2 - 3 days.      If you have any questions or concerns, please don't hesitate to call.    Sincerely,        Eladio Davis MD

## 2018-01-19 NOTE — PATIENT INSTRUCTIONS
Please drink plenty of fluids.  Please get plenty of rest.  Please return here or go to the Emergency Department for any concerns or worsening of condition.    If you were prescribed a narcotic medication, do not drive or operate heavy equipment or machinery while taking these medications.    Use a Dr. Gary's pad with heel area removed to relieve pressure on heel.  If crutches were recommended, use them for touch down ambulation to take weight off the affected foot.      If you were not prescribed an anti-inflammatory medication, and if you do not have any history of stomach/intestinal ulcers, or kidney disease, or are not taking a blood thinner such as Coumadin, Plavix, Pradaxa, Eloquis, or Xaralta for example, it is OK to take over the counter Ibuprofen or Advil or Motrin or Aleve as directed.  Do not take these medications on an empty stomach.  If not allergic, please take over the counter Tylenol (Acetaminophen) and/or Motrin (Ibuprofen) as directed for control of pain and/or fever.    Please follow up with your primary care doctor or specialist as needed.  Devorah Acosta MD  [phone]    Podiatry - [place]    Dr. Angelo Combs  [address] [place] [address].  [place]  [address]  [phone]      Heel Spurs    The plantar fascia is a thick, fibrous layer of tissue that covers the bones on the bottom of your foot. It holds the foot bones in an arched position. Plantar fasciitis is a painful swelling of the plantar fascia.  A heel spur is an overgrowth of bone where the plantar fascia attaches to the heel bone. The heel spur itself usually doesnt cause pain. However, the heel spur might be a sign of plantar fasciitis which may cause your foot pain. There is no specific treatment for heel spurs.   Plantar fasciitis can develop slowly or suddenly. It usually affects one foot at a time. Heel pain can feel sharp, like a knife sticking into the bottom of your foot. You may feel pain after exercising, long-distance  jogging, stair climbing, long periods of standing, or after standing up.  Risk factors for plantar fasciitis include: arthritis, diabetes, obesity or recent weight gain, flat foot, and having high arches. Wearing high heels, loose shoes, or shoes with poor arch support adds to the risk.    Foot pain is usually worse in the morning. But it improves with walking. By the end of the day there may be a dull aching. Treatment includes short-term rest and controlling inflammation. It may take up to 9 months before all symptoms go away. In rare cases, a steroid injection in the foot, or surgery, may be needed.  Home care  · If you are overweight, lose weight to help healing.  · Choose supportive shoes with good arch support and shock absorbency. Replace athletic shoes when they become worn out. Dont walk or run barefoot.  · Premade or custom-fitted shoe inserts may be helpful. Inserts made of silicone seem to be the most effective. Custom-made inserts can be provided by a podiatrist or foot specialist, physical therapist, or orthopedist.  · Premade or custom-made night splints keep the heel stretched out while you sleep. They may prevent morning pain.  · Avoid activities that stress the feet: jogging, prolonged standing or walking, contact sports, etc.  · First thing in the morning and before sports, stretch the bottom of your foot. Gently flex your ankle so the toes move toward your knee.  · Icing may help control heel pain. Apply an ice pack to the heel for 10-20 minutes as a preventive. Or ice your heel after a severe flare-up of symptoms. You may repeat this every 1-2 hours as needed.  · You may use over-the-counter pain medicine to control pain, unless another medicine was prescribed. Anti-inflammatory pain medicines, such as ibuprofen or naproxen, may work better than acetaminophen. If you have chronic liver or kidney disease or ever had a stomach ulcer or GI bleeding, talk with your healthcare provider before using  these medicines.  · Shoe inserts, a night splint, or a special boot may be needed. Use these as directed by your healthcare provider.  Follow-up care   Follow up with your healthcare provider, physical therapist, or podiatrist or foot specialist as advised.  When to seek medical advice  Call your healthcare provider right away if any of these occur:  · The pain worsens.  · There is no relief after a few weeks of home treatment.   Date Last Reviewed: 11/23/2015 © 2000-2017 [employer]. [address] [place] [address]. All rights reserved. This information is not intended as a substitute for professional medical care. Always follow your healthcare professional's instructions.

## 2018-02-23 ENCOUNTER — OFFICE VISIT (OUTPATIENT)
Dept: HEMATOLOGY/ONCOLOGY | Facility: CLINIC | Age: 48
End: 2018-02-23
Payer: MEDICAID

## 2018-02-23 VITALS
WEIGHT: 266.31 LBS | OXYGEN SATURATION: 95 % | HEART RATE: 137 BPM | RESPIRATION RATE: 16 BRPM | TEMPERATURE: 98 F | BODY MASS INDEX: 39.44 KG/M2 | SYSTOLIC BLOOD PRESSURE: 134 MMHG | HEIGHT: 69 IN | DIASTOLIC BLOOD PRESSURE: 89 MMHG

## 2018-02-23 DIAGNOSIS — Z17.0 CARCINOMA OF AXILLARY TAIL OF LEFT BREAST IN FEMALE, ESTROGEN RECEPTOR POSITIVE: ICD-10-CM

## 2018-02-23 DIAGNOSIS — C50.612 CARCINOMA OF AXILLARY TAIL OF LEFT BREAST IN FEMALE, ESTROGEN RECEPTOR POSITIVE: ICD-10-CM

## 2018-02-23 PROBLEM — Z79.811 USE OF AROMATASE INHIBITORS: Status: RESOLVED | Noted: 2017-03-10 | Resolved: 2018-02-23

## 2018-02-23 PROCEDURE — 99214 OFFICE O/P EST MOD 30 MIN: CPT | Mod: S$PBB,,, | Performed by: INTERNAL MEDICINE

## 2018-02-23 PROCEDURE — 3008F BODY MASS INDEX DOCD: CPT | Mod: ,,, | Performed by: INTERNAL MEDICINE

## 2018-02-23 PROCEDURE — 99999 PR PBB SHADOW E&M-EST. PATIENT-LVL III: CPT | Mod: PBBFAC,,, | Performed by: INTERNAL MEDICINE

## 2018-02-23 PROCEDURE — 99213 OFFICE O/P EST LOW 20 MIN: CPT | Mod: PBBFAC | Performed by: INTERNAL MEDICINE

## 2018-02-23 RX ORDER — ATORVASTATIN CALCIUM 20 MG/1
20 TABLET, FILM COATED ORAL
Refills: 3 | COMMUNITY
Start: 2018-02-18 | End: 2023-03-22

## 2018-02-23 RX ORDER — CITALOPRAM 20 MG/1
20 TABLET, FILM COATED ORAL
Refills: 3 | COMMUNITY
Start: 2018-02-18 | End: 2019-05-16

## 2018-02-23 RX ORDER — MECLIZINE HYDROCHLORIDE 25 MG/1
25 TABLET ORAL
Refills: 0 | COMMUNITY
Start: 2018-02-18 | End: 2023-03-22

## 2018-02-23 RX ORDER — METFORMIN HYDROCHLORIDE 850 MG/1
850 TABLET ORAL 2 TIMES DAILY
Refills: 3 | COMMUNITY
Start: 2018-02-18 | End: 2019-01-27

## 2018-02-23 NOTE — PROGRESS NOTES
Subjective:       Patient ID: Allegra Reese is a 47 y.o. female.    Chief Complaint: No chief complaint on file.    HPI   Mrs. Reese returns today for follow up.   In late January 2017 she underwent a laparoscopic oophorectomies.  She remains on AIs.   Briefly, she is a 47-year-old  female from [place] who was diagnosed with breast cancer and on 04/27/2016 underwent bilateral mastectomies with reconstruction.   There was no invasive cancer or DCIS in the right breast; in the left breast she had three areas of an infiltrating ductal carcinoma measuring 11 mm, 5 mm, and 3 mm respectively.  One sentinel lymph node was positive and a full axillary dissection was performed with a total of five lymph nodes being positive.  She was tested and found to be negative for BRCA mutations.  Of note, her cancer was strongly ER and TN positive and HER 2 megative  She started adjuvant chemotherapy on June 24th 2016, and completed 4 cycles of AC and four cycles of paclitaxel.   She subsequently received XRT and was started on zoladex plus anastrazole.    Apparently she underwent a reconstruction on April 29, 2017, and two weeks alter she presented to the ED with SOB and was found to have PEs.  She was started on rivaroxaban, and is currently on 20 mg QHS.       Review of Systems    Overall today she feels OK.    Her  ECOG PS remains 1.  Since ehr last cvisit she has been diagnosed with DM and was started on metformin.  She again complains of wrist pains bilaterally, and also of stiffness nvolving primarily her thumbs and her hands, presumably from the anastrazole.  She denies any easy bruising, fevers, chills, night  sweats, weight loss, nausea, vomiting, diarrhea, constipation, diplopia, blurred vision, headaches, chest pain, palpitations, shortness of breath, breast pain, upper extremity pain, or difficulty ambulating.  The remainder of the ten-point ROS, including general, skin, lymph, H/N, cardiorespiratory, GI,  , Neuro, Endocrine, and psychiatric is negative.   Of note, she underwent removal of her port earlier today.    Objective:      Physical Exam    She is alert, oriented to time, place, person, pleasant, well      nourished, in no acute physical distress.                                    VITAL SIGNS:  Reviewed                                      HEENT: normal.  There are no nasal, oral, lip, gingival, auricular, lid,    or conjunctival lesions.  Mucosae are moist and pink, and there is no        thrush.  Pupils are equal, reactive to light and accommodation.              Extraocular muscle movements are intact.    Dentition is good.                                     NECK:  Supple without JVD, adenopathy, or thyromegaly.                       LUNGS:  Clear to auscultation without wheezing, rales, or rhonchi.           CARDIOVASCULAR:  Reveals an S1, S2, no murmurs, no rubs, no gallops.         ABDOMEN:  Soft, nontender, without organomegaly.  Bowel sounds are    present.   The abdominal incision has healed.                                                                 EXTREMITIES:  No cyanosis, clubbing, or edema.                               BREASTS:  She is status post bilateral mastectomies with free flap reconstructions.  The incisions have healed nicely.    There is a 2 cm hard nodule at the 9 o' clock position in the right reconstructed breast, representing fat necrosis.   This has been noted previously                        LYMPHATIC:  There is no cervical, axillary, or supraclavicular adenopathy.   SKIN:  Warm and moist, without petechiae, rashes, induration, or ecchymoses.  There is mild hyperpigmentation within the radiation field on the left chest wall.         NEUROLOGIC:  DTRs are 0-1+ bilaterally, symmetrical, motor function is 5/5,  and cranial nerves are  within normal limits.    Assessment:       1. 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      PE, possibly related to the administration of AIs.   4.      Fat necrosis, right reconstructed breast  5.      Thumb and hand joint pains bilaterally secondary to AIs.   Plan:          I had a long discussion with her;  She will remain on anastrazole for now, and she will complete her 5 years in December 2021.  RTC 3 months.  Her multiple questions were answered to her satisfaction.  In regards to her PE, I recommended that she remain on rivaroxaban for the whole time that she is on AIs.   Her questions were answered to her satisfaction.

## 2018-03-08 ENCOUNTER — TELEPHONE (OUTPATIENT)
Dept: HEMATOLOGY/ONCOLOGY | Facility: CLINIC | Age: 48
End: 2018-03-08

## 2018-04-05 ENCOUNTER — PATIENT MESSAGE (OUTPATIENT)
Dept: HEMATOLOGY/ONCOLOGY | Facility: CLINIC | Age: 48
End: 2018-04-05

## 2018-04-19 ENCOUNTER — TELEPHONE (OUTPATIENT)
Dept: HEMATOLOGY/ONCOLOGY | Facility: CLINIC | Age: 48
End: 2018-04-19

## 2018-04-19 NOTE — TELEPHONE ENCOUNTER
Received a message from central triage with a request from the patient to speak to a counselor. Since it was late in the day, called the patient to assess the urgency of the call, but she indicated that it was not urgent and that she would wait for a call from the Oncology Social Worker in Dr Lucas's office to call her tomorrow. Notified (verbally and by messaging) June Gann LCSW, OSW-C working with Dr Lucas and she agreed to call the patient.

## 2018-06-07 ENCOUNTER — TELEPHONE (OUTPATIENT)
Dept: HEMATOLOGY/ONCOLOGY | Facility: CLINIC | Age: 48
End: 2018-06-07

## 2018-06-07 NOTE — TELEPHONE ENCOUNTER
Called patient, no answer left message asking pt to call back as we are needing to reschedule her apt

## 2018-06-13 ENCOUNTER — PATIENT MESSAGE (OUTPATIENT)
Dept: HEMATOLOGY/ONCOLOGY | Facility: CLINIC | Age: 48
End: 2018-06-13

## 2018-06-14 ENCOUNTER — TELEPHONE (OUTPATIENT)
Dept: HEMATOLOGY/ONCOLOGY | Facility: CLINIC | Age: 48
End: 2018-06-14

## 2018-06-27 NOTE — PROGRESS NOTES
"Subjective:       Patient ID: Allegra Reese is a 48 y.o. female.    Chief Complaint: Follow-up    HPI   Mrs. Reese returns today for follow up.   In late January 2017 she underwent a laparoscopic oophorectomies.  She remains on AIs.     Briefly, she is a 47-year-old  female from [place] who was diagnosed with breast cancer and on 04/27/2016 underwent bilateral mastectomies with reconstruction.   There was no invasive cancer or DCIS in the right breast; in the left breast she had three areas of an infiltrating ductal carcinoma measuring 11 mm, 5 mm, and 3 mm respectively.  One sentinel lymph node was positive and a full axillary dissection was performed with a total of five lymph nodes being positive.  She was tested and found to be negative for BRCA mutations.  Of note, her cancer was strongly ER and MS positive and HER 2 megative  She started adjuvant chemotherapy on June 24th 2016, and completed 4 cycles of AC and four cycles of paclitaxel.   She subsequently received XRT and was started on zoladex plus anastrazole.    Apparently she underwent a reconstruction on April 29, 2017, and two weeks alter she presented to the ED with SOB and was found to have PEs.  She was started on rivaroxaban, and is currently on 20 mg QHS.       Review of Systems    Overall today she feels OK.   She is anxious and worries about her cancer coming back. She feels pain in the left breast. She has occasional axilla pain and notes occasional lymphedema. Her diabetes is not under control at this time. Once her diabetes is controlled, she will have plastic surgery to "fix" her left breast.  She again complains of wrist pains bilaterally, and also of stiffness nvolving primarily her thumbs and her hands, presumably from the anastrazole.  She denies any easy bruising, fevers, chills, night  sweats, weight loss, nausea, vomiting, diarrhea, constipation, diplopia, blurred vision, headaches, chest pain, palpitations, shortness " of breath,   or difficulty ambulating.  The remainder of the ten-point ROS, including general, skin, lymph, H/N, cardiorespiratory, GI, , Neuro, Endocrine, and psychiatric is negative.   Her  ECOG PS remains 1.     Objective:      Physical Exam    She is alert, oriented to time, place, person, pleasant, well      nourished, in no acute physical distress.                                    VITAL SIGNS:  Reviewed                                      HEENT: normal.  There are no nasal, oral, lip, gingival, auricular, lid,    or conjunctival lesions.  Mucosae are moist and pink, and there is no        thrush.  Pupils are equal, reactive to light and accommodation.              Extraocular muscle movements are intact.    Dentition is good.                                     NECK:  Supple without JVD, adenopathy, or thyromegaly.                       LUNGS:  Clear to auscultation without wheezing, rales, or rhonchi.           CARDIOVASCULAR:  Reveals an S1, S2, no murmurs, no rubs, no gallops.         ABDOMEN:  Soft, nontender, without organomegaly.  Bowel sounds are    present.   The abdominal incision has healed.                                                                 EXTREMITIES:  No cyanosis, clubbing, or edema.                               BREASTS:  She is status post bilateral mastectomies with free flap reconstructions.  The incisions have healed nicely.    There is a 2 cm hard nodule at the 9 o' clock position in the right reconstructed breast, representing fat necrosis. This has been noted previously.  No masses in her left breast but it is smaller than her right. Small indentation to left side of breast.                         LYMPHATIC:  There is no cervical, axillary, or supraclavicular adenopathy.   SKIN:  Warm and moist, without petechiae, rashes, induration, or ecchymoses.  There is mild hyperpigmentation within the radiation field on the left chest wall.         NEUROLOGIC:  DTRs are 0-1+  bilaterally, symmetrical, motor function is 5/5,  and cranial nerves are  within normal limits.    Assessment:       1. Breast pain, left    2. Carcinoma of axillary tail of left breast in female, estrogen receptor positive    3. Use of aromatase inhibitors    4. Lymphedema    5. Anxiety about health     3.     PE, possibly related to the administration of AIs.   4.      Fat necrosis, right reconstructed breast  5.      Thumb and hand joint pains bilaterally secondary to AIs.   Plan:          Patient is doing well and is clinically stable. She will remain on anastrazole for now, and she will complete her 5 years in December 2021.  RTC 3 months to see Dr. Lloyd.   She is having pain in her left breast therefor I will send her for a left breast mammogram.   Refer to Dr. Perez for anxiety about health. I've encouraged her to contact her pcp for follow up and to possibly increase her celexa.   In regards to her PE, I recommended that she remain on rivaroxaban for the whole time that she is on AIs.   Her questions were answered to her satisfaction.    Patient is in agreement with the proposed treatment plan. All questions were answered to the patient's satisfaction. Pt knows to call clinic if anything is needed before the next clinic visit.      CARLOS Lal-C  Hematology & Oncology  [facility] [place] [address]  ph. [phone]  Fax. [phone]     30 minutes were spent in coordination of patient's care, record review and counseling. More than 50% of the time was face-to-face.           Distress Screening Results: Psychosocial Distress screening score of Distress Score: 4 noted and reviewed. No intervention indicated.

## 2018-06-28 ENCOUNTER — HOSPITAL ENCOUNTER (OUTPATIENT)
Dept: RADIOLOGY | Facility: HOSPITAL | Age: 48
Discharge: HOME | End: 2018-06-28
Attending: NURSE PRACTITIONER
Payer: MEDICAID

## 2018-06-28 ENCOUNTER — OFFICE VISIT (OUTPATIENT)
Dept: HEMATOLOGY/ONCOLOGY | Facility: CLINIC | Age: 48
End: 2018-06-28
Payer: MEDICAID

## 2018-06-28 VITALS
SYSTOLIC BLOOD PRESSURE: 125 MMHG | BODY MASS INDEX: 39.65 KG/M2 | WEIGHT: 268.5 LBS | OXYGEN SATURATION: 97 % | HEART RATE: 82 BPM | RESPIRATION RATE: 18 BRPM | DIASTOLIC BLOOD PRESSURE: 73 MMHG | TEMPERATURE: 98 F

## 2018-06-28 VITALS — HEIGHT: 69 IN | WEIGHT: 268 LBS | BODY MASS INDEX: 39.69 KG/M2

## 2018-06-28 DIAGNOSIS — I89.0 LYMPHEDEMA: ICD-10-CM

## 2018-06-28 DIAGNOSIS — Z17.0 CARCINOMA OF AXILLARY TAIL OF LEFT BREAST IN FEMALE, ESTROGEN RECEPTOR POSITIVE: ICD-10-CM

## 2018-06-28 DIAGNOSIS — C50.612 CARCINOMA OF AXILLARY TAIL OF LEFT BREAST IN FEMALE, ESTROGEN RECEPTOR POSITIVE: ICD-10-CM

## 2018-06-28 DIAGNOSIS — R45.89 ANXIETY ABOUT HEALTH: ICD-10-CM

## 2018-06-28 DIAGNOSIS — Z79.811 USE OF AROMATASE INHIBITORS: ICD-10-CM

## 2018-06-28 DIAGNOSIS — N64.4 BREAST PAIN, LEFT: ICD-10-CM

## 2018-06-28 DIAGNOSIS — N64.4 BREAST PAIN, LEFT: Primary | ICD-10-CM

## 2018-06-28 PROCEDURE — 77065 DX MAMMO INCL CAD UNI: CPT | Mod: TC,PO,LT

## 2018-06-28 PROCEDURE — 77065 DX MAMMO INCL CAD UNI: CPT | Mod: 26,LT,, | Performed by: RADIOLOGY

## 2018-06-28 PROCEDURE — 99214 OFFICE O/P EST MOD 30 MIN: CPT | Mod: PBBFAC | Performed by: NURSE PRACTITIONER

## 2018-06-28 PROCEDURE — 99999 PR PBB SHADOW E&M-EST. PATIENT-LVL IV: CPT | Mod: PBBFAC,,, | Performed by: NURSE PRACTITIONER

## 2018-06-28 PROCEDURE — 77061 BREAST TOMOSYNTHESIS UNI: CPT | Mod: 26,LT,, | Performed by: RADIOLOGY

## 2018-06-28 PROCEDURE — 77061 BREAST TOMOSYNTHESIS UNI: CPT | Mod: TC,PO,LT

## 2018-06-28 PROCEDURE — 99214 OFFICE O/P EST MOD 30 MIN: CPT | Mod: S$PBB,,, | Performed by: NURSE PRACTITIONER

## 2018-07-02 ENCOUNTER — TELEPHONE (OUTPATIENT)
Dept: INFUSION THERAPY | Facility: HOSPITAL | Age: 48
End: 2018-07-02

## 2018-07-19 ENCOUNTER — OFFICE VISIT (OUTPATIENT)
Dept: PSYCHIATRY | Facility: CLINIC | Age: 48
End: 2018-07-19
Payer: MEDICAID

## 2018-07-19 DIAGNOSIS — F33.1 MODERATE EPISODE OF RECURRENT MAJOR DEPRESSIVE DISORDER: Primary | ICD-10-CM

## 2018-07-19 DIAGNOSIS — F41.1 GENERALIZED ANXIETY DISORDER: ICD-10-CM

## 2018-07-19 PROCEDURE — 99999 PR PBB SHADOW E&M-EST. PATIENT-LVL I: CPT | Mod: PBBFAC,,, | Performed by: PSYCHOLOGIST

## 2018-07-19 PROCEDURE — 99211 OFF/OP EST MAY X REQ PHY/QHP: CPT | Mod: PBBFAC,25 | Performed by: PSYCHOLOGIST

## 2018-07-19 PROCEDURE — 90791 PSYCH DIAGNOSTIC EVALUATION: CPT | Mod: PBBFAC | Performed by: PSYCHOLOGIST

## 2018-07-19 PROCEDURE — 90791 PSYCH DIAGNOSTIC EVALUATION: CPT | Mod: S$PBB,,, | Performed by: PSYCHOLOGIST
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
## 2018-07-19 NOTE — LETTER
July 20, 2018        Molly Mckenzie NP  [address] [facility] [address]             [facility]  [address] [facility] [address]  Phone: [phone]  Fax: [phone]   Patient: Allegra Reese   MR Number: [id number]   YOB: 1970   Date of Visit: 7/19/2018       Dear Dr. Mckenzie:    Thank you for referring Allegra Reese to me for evaluation. Below are the relevant portions of my assessment and plan of care.     Allegra Reese is a 48 y.o. female patient of Dr. Lucas referred by RON Mckenzie NP for psychological evaluation and treatment.  Mrs. Reese describes recurrence of major depression and exacerbation of her long-term anxiety by her cancer diagnosis/treatment. She also describes significant psychosocial strain.  Patient is currently taking Celexa with limited response (recently increased to 40 mg).  She was encouraged to either renew her relationship with her prior psychiatrist or establish care with a new mental health prescriber. She is interested in CBT to address depression/anxiety/insomnia and will follow up with me for that purpose.    If you have questions, please do not hesitate to call me. I look forward to following Allegra along with you.    Sincerely,      Evelio Perez, PhD           CC  Conner Lucas MD

## 2018-07-20 PROBLEM — F41.1 GENERALIZED ANXIETY DISORDER: Status: ACTIVE | Noted: 2018-07-20

## 2018-07-24 ENCOUNTER — OFFICE VISIT (OUTPATIENT)
Dept: PSYCHIATRY | Facility: CLINIC | Age: 48
End: 2018-07-24
Payer: MEDICAID

## 2018-07-24 DIAGNOSIS — F33.1 MODERATE EPISODE OF RECURRENT MAJOR DEPRESSIVE DISORDER: Primary | ICD-10-CM

## 2018-07-24 DIAGNOSIS — F41.1 GENERALIZED ANXIETY DISORDER: ICD-10-CM

## 2018-07-24 PROCEDURE — 99212 OFFICE O/P EST SF 10 MIN: CPT | Mod: PBBFAC | Performed by: PSYCHOLOGIST

## 2018-07-24 PROCEDURE — 99999 PR PBB SHADOW E&M-EST. PATIENT-LVL II: CPT | Mod: PBBFAC,,, | Performed by: PSYCHOLOGIST

## 2018-07-24 PROCEDURE — 90834 PSYTX W PT 45 MINUTES: CPT | Mod: PBBFAC | Performed by: PSYCHOLOGIST

## 2018-07-24 PROCEDURE — 90834 PSYTX W PT 45 MINUTES: CPT | Mod: S$PBB,,, | Performed by: PSYCHOLOGIST

## 2018-07-24 NOTE — PROGRESS NOTES
PSYCHO-ONCOLOGY NOTE/ Individual Psychotherapy     Date: 7/24/2018   Site:  [facility]        Therapeutic Intervention: Met with patient.  Outpatient - Behavior modifying psychotherapy 45 min - CPT code 93422    The patient's last visit with me was on 7/19/2018.    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    Malignant neoplasm of left breast    Right arm pain    Phlebitis of superficial vein of upper extremity    Essential hypertension    Moderate episode of recurrent major depressive disorder    Breast cancer    Neuropathy    Lymphedema    Pulmonary emboli    Use of aromatase inhibitors    Carcinoma of axillary tail of left breast in female, estrogen receptor positive    Anxiety about health    Generalized anxiety disorder       Chief complaint/reason for encounter: depression, anxiety and sleep   Met with patient to evaluate psychosocial adaptation to diagnosis/treatment/survivorship of breast cancer    Current Medications  Current Outpatient Prescriptions   Medication    alprazolam (XANAX) 0.5 MG tablet    anastrozole (ARIMIDEX) 1 mg Tab    atorvastatin (LIPITOR) 20 MG tablet    citalopram (CELEXA) 20 MG tablet    meclizine (ANTIVERT) 25 mg tablet    metFORMIN (GLUCOPHAGE) 850 MG tablet    metoprolol succinate (TOPROL-XL) 25 MG 24 hr tablet    traMADol (ULTRAM) 50 mg tablet    XARELTO 20 mg Tab     No current facility-administered medications for this visit.        Objective:  Allegra Reese arrived promptly for the session.  Ms. Reese was independently ambulatory at the time of session. The patient was fully cooperative throughout the session.  Appearance: age appropriate,  casually dressed, adequately groomed  Behavior/Cooperation: friendly and cooperative  Speech: Not pressured, clearly audible, no slurring  Mood: anxious and depressed  Affect: mildly constricted, tearful  Thought Process: goal-directed,  logical  Thought Content: normal,  No delusions or paranoia; did not appear to be responding to internal stimuli during the session  Orientation: grossly intact  Memory: Grossly intact  Attention Span/Concentration: Attends to session without distraction; reports no difficulty  Fund of Knowledge: average  Estimate of Intelligence: average from verbal skills and history  Cognition: grossly intact  Insight: patient has awareness of illness; good insight into own behavior and behavior of others  Judgment: the patient's behavior is adequate to circumstances    Interval history and content of current session: Mrs. Reese discussed activities and social interactions since the time of last visit. Reviewed therapy activities from last visit (relaxation exercises, sleep scheduling). Discussed current adaptation to disease and treatment status and family's adaptation to disease and treatment status. Reports to be coping in an adequate manner, in a passive manner, with great difficulty and with significant difficulty. Evaluated cognitive response, paying particular attention to negative intrusive thoughts of a persistent and detrimental nature. Thoughts of this type are in evidence with moderate distress. Provided cognitive behavioral therapy to address negative cognitions. Identified and evaluated psychosocial and environmental stressors secondary to diagnosis and treatment.  Examined proactive behaviors that may be implemented to minimize or ameliorate psychosocial stressors secondary to diagnosis and treatment.     Risk parameters:   Patient reports no suicidal ideation  Patient reports no homicidal ideation  Patient reports no self-injurious behavior  Patient reports no violent behavior   Safety needs:  None at this time      Verbal deficits: None     Patient's response to intervention:The patient's response to intervention is accepting.     Progress toward goals and other mental status changes:  The patient's progress  toward goals is good.      Progress to date:Progress as Expected      Goals from last visit: Met      Patient reported outcomes:  Distress Thermometer:   Distress Score    Distress Score: 7        Practical Problems Physical Problems                                                   Family Problems                                         Emotional Problems                                                         Spiritual/Religions Concerns               Other Problems             PHQ-9= 12; 16 at inake   BRENDA-7= 16; 18 at intake      Client Strengths: verbal, intelligent, successful, good social support, good insight, commitment to wellness    Diagnosis:     ICD-10-CM ICD-9-CM   1. Generalized anxiety disorder F41.1 300.02   2. Moderate episode of recurrent major depressive disorder F33.1 296.32       Treatment Plan:individual psychotherapy and consult psychiatrist for medication evaluation  · Target symptoms: depression, anxiety   · Why chosen therapy is appropriate versus another modality: relevant to diagnosis, evidence based practice  · Outcome monitoring methods: self-report, checklist/rating scale  · Therapeutic intervention type: behavior modifying psychotherapy  · Prognosis: Excellent      Behavioral goals:    Exercise:   Stress management:  Relaxation exercises daily   Social engagement:   Nutrition:   Smoking Cessation:   Therapy:  Write down worries at night    Standard wake time    Return to clinic: 1 week     Length of Service (minutes direct face-to-face contact): 45    Evelio Perez, PhD  [place] License #[id number]

## 2018-08-03 ENCOUNTER — OFFICE VISIT (OUTPATIENT)
Dept: PSYCHIATRY | Facility: CLINIC | Age: 48
End: 2018-08-03
Payer: MEDICAID

## 2018-08-03 DIAGNOSIS — F41.1 GENERALIZED ANXIETY DISORDER: ICD-10-CM

## 2018-08-03 DIAGNOSIS — F33.1 MODERATE EPISODE OF RECURRENT MAJOR DEPRESSIVE DISORDER: Primary | ICD-10-CM

## 2018-08-03 PROCEDURE — 99999 PR PBB SHADOW E&M-EST. PATIENT-LVL II: CPT | Mod: PBBFAC,,, | Performed by: PSYCHOLOGIST

## 2018-08-03 PROCEDURE — 90834 PSYTX W PT 45 MINUTES: CPT | Mod: PBBFAC | Performed by: PSYCHOLOGIST

## 2018-08-03 PROCEDURE — 90834 PSYTX W PT 45 MINUTES: CPT | Mod: S$PBB,,, | Performed by: PSYCHOLOGIST

## 2018-08-03 PROCEDURE — 99212 OFFICE O/P EST SF 10 MIN: CPT | Mod: PBBFAC,25 | Performed by: PSYCHOLOGIST

## 2018-08-03 NOTE — PROGRESS NOTES
PSYCHO-ONCOLOGY NOTE/ Individual Psychotherapy     Date: 8/3/2018   Site:  [facility]        Therapeutic Intervention: Met with patient.  Outpatient - Behavior modifying psychotherapy 45 min - CPT code 11030    The patient's last visit with me was on 7/24/2018.    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    Malignant neoplasm of left breast    Right arm pain    Phlebitis of superficial vein of upper extremity    Essential hypertension    Moderate episode of recurrent major depressive disorder    Breast cancer    Neuropathy    Lymphedema    Pulmonary emboli    Use of aromatase inhibitors    Carcinoma of axillary tail of left breast in female, estrogen receptor positive    Anxiety about health    Generalized anxiety disorder       Chief complaint/reason for encounter: depression, anxiety and sleep   Met with patient to evaluate psychosocial adaptation to diagnosis/treatment/survivorship of breast cancer    Current Medications  Current Outpatient Prescriptions   Medication    alprazolam (XANAX) 0.5 MG tablet    anastrozole (ARIMIDEX) 1 mg Tab    atorvastatin (LIPITOR) 20 MG tablet    citalopram (CELEXA) 20 MG tablet    meclizine (ANTIVERT) 25 mg tablet    metFORMIN (GLUCOPHAGE) 850 MG tablet    metoprolol succinate (TOPROL-XL) 25 MG 24 hr tablet    traMADol (ULTRAM) 50 mg tablet    XARELTO 20 mg Tab     No current facility-administered medications for this visit.        Objective:  Allegra Reese arrived promptly for the session.  Ms. Reese was independently ambulatory at the time of session. The patient was fully cooperative throughout the session.  Appearance: age appropriate,  casually dressed, adequately groomed  Behavior/Cooperation: friendly and cooperative  Speech: Not pressured, clearly audible, no slurring  Mood: anxious and depressed  Affect: mildly constricted, tearful  Thought Process: goal-directed, logical  Thought  Content: normal,  No delusions or paranoia; did not appear to be responding to internal stimuli during the session  Orientation: grossly intact  Memory: Grossly intact  Attention Span/Concentration: Attends to session without distraction; reports no difficulty  Fund of Knowledge: average  Estimate of Intelligence: average from verbal skills and history  Cognition: grossly intact  Insight: patient has awareness of illness; good insight into own behavior and behavior of others  Judgment: the patient's behavior is adequate to circumstances    Interval history and content of current session: Mrs. Reese discussed activities and social interactions since the time of last visit. Reviewed therapy activities from last visit (relaxation exercises, sleep scheduling).  Patient discussed recent thoughts about fairness and illness which are challenging her world view.  Also discussed distress over financial dealings with ex-.  Discussed current adaptation to disease and treatment status and family's adaptation to disease and treatment status. Reports to be coping with moderate difficulty. Evaluated cognitive response, paying particular attention to negative intrusive thoughts of a persistent and detrimental nature. Thoughts of this type are in evidence with moderate distress. Provided cognitive behavioral therapy to address negative cognitions. Identified and evaluated psychosocial and environmental stressors secondary to diagnosis and treatment.  Examined proactive behaviors that may be implemented to minimize or ameliorate psychosocial stressors secondary to diagnosis and treatment.     Risk parameters:   Patient reports no suicidal ideation  Patient reports no homicidal ideation  Patient reports no self-injurious behavior  Patient reports no violent behavior   Safety needs:  None at this time      Verbal deficits: None     Patient's response to intervention:The patient's response to intervention is  accepting.     Progress toward goals and other mental status changes:  The patient's progress toward goals is good.      Progress to date:Progress as Expected      Goals from last visit: Met      Patient reported outcomes:  Distress Thermometer:   Distress Score    Distress Score: 7        Practical Problems Physical Problems                                                   Family Problems                                         Emotional Problems                                                         Spiritual/Religions Concerns               Other Problems             PHQ-9= 8; 16 at inake   BRENDA-7= 12; 18 at intake      Client Strengths: verbal, intelligent, successful, good social support, good insight, commitment to wellness    Diagnosis:     ICD-10-CM ICD-9-CM   1. Moderate episode of recurrent major depressive disorder F33.1 296.32   2. Generalized anxiety disorder F41.1 300.02       Treatment Plan:individual psychotherapy and consult psychiatrist for medication evaluation  · Target symptoms: depression, anxiety   · Why chosen therapy is appropriate versus another modality: relevant to diagnosis, evidence based practice  · Outcome monitoring methods: self-report, checklist/rating scale  · Therapeutic intervention type: behavior modifying psychotherapy  · Prognosis: Excellent      Behavioral goals:    Exercise:   Stress management:  Relaxation exercises daily   Social engagement:   Nutrition:   Smoking Cessation:   Therapy:  Write down worries at night    Standard wake time    Prepare for school    Return to clinic: 1 week     Length of Service (minutes direct face-to-face contact): 45    Evelio Perez, PhD  [place] License #[id number]

## 2018-08-17 ENCOUNTER — OFFICE VISIT (OUTPATIENT)
Dept: PSYCHIATRY | Facility: CLINIC | Age: 48
End: 2018-08-17
Payer: COMMERCIAL

## 2018-08-17 DIAGNOSIS — F41.1 GENERALIZED ANXIETY DISORDER: Primary | ICD-10-CM

## 2018-08-17 DIAGNOSIS — F33.1 MODERATE EPISODE OF RECURRENT MAJOR DEPRESSIVE DISORDER: ICD-10-CM

## 2018-08-17 PROCEDURE — 99212 OFFICE O/P EST SF 10 MIN: CPT | Mod: PBBFAC,25 | Performed by: PSYCHOLOGIST

## 2018-08-17 PROCEDURE — 99999 PR PBB SHADOW E&M-EST. PATIENT-LVL II: CPT | Mod: PBBFAC,,, | Performed by: PSYCHOLOGIST

## 2018-08-17 PROCEDURE — 90834 PSYTX W PT 45 MINUTES: CPT | Mod: S$PBB,,, | Performed by: PSYCHOLOGIST

## 2018-08-17 PROCEDURE — 90834 PSYTX W PT 45 MINUTES: CPT | Mod: PBBFAC | Performed by: PSYCHOLOGIST

## 2018-08-17 NOTE — PROGRESS NOTES
PSYCHO-ONCOLOGY NOTE/ Individual Psychotherapy     Date: 8/17/2018   Site:  [facility]        Therapeutic Intervention: Met with patient.  Outpatient - Behavior modifying psychotherapy 45 min - CPT code 90391    The patient's last visit with me was on 8/3/2018.    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    Malignant neoplasm of left breast    Right arm pain    Phlebitis of superficial vein of upper extremity    Essential hypertension    Moderate episode of recurrent major depressive disorder    Breast cancer    Neuropathy    Lymphedema    Pulmonary emboli    Use of aromatase inhibitors    Carcinoma of axillary tail of left breast in female, estrogen receptor positive    Anxiety about health    Generalized anxiety disorder       Chief complaint/reason for encounter: depression, anxiety and sleep   Met with patient to evaluate psychosocial adaptation to diagnosis/treatment/survivorship of breast cancer    Current Medications  Current Outpatient Medications   Medication    alprazolam (XANAX) 0.5 MG tablet    anastrozole (ARIMIDEX) 1 mg Tab    atorvastatin (LIPITOR) 20 MG tablet    citalopram (CELEXA) 20 MG tablet    meclizine (ANTIVERT) 25 mg tablet    metFORMIN (GLUCOPHAGE) 850 MG tablet    metoprolol succinate (TOPROL-XL) 25 MG 24 hr tablet    traMADol (ULTRAM) 50 mg tablet    XARELTO 20 mg Tab     No current facility-administered medications for this visit.        Objective:  Allegra Reese arrived promptly for the session.  Ms. Reese was independently ambulatory at the time of session. The patient was fully cooperative throughout the session.  Appearance: age appropriate,  casually dressed, adequately groomed  Behavior/Cooperation: friendly and cooperative  Speech: Not pressured, clearly audible, no slurring  Mood: anxious and depressed  Affect: mildly constricted, tearful  Thought Process: goal-directed, logical  Thought  Content: normal,  No delusions or paranoia; did not appear to be responding to internal stimuli during the session  Orientation: grossly intact  Memory: Grossly intact  Attention Span/Concentration: Attends to session without distraction; reports no difficulty  Fund of Knowledge: average  Estimate of Intelligence: average from verbal skills and history  Cognition: grossly intact  Insight: patient has awareness of illness; good insight into own behavior and behavior of others  Judgment: the patient's behavior is adequate to circumstances    Interval history and content of current session: Mrs. Reese discussed activities and social interactions since the time of last visit. Reviewed therapy activities from last visit (relaxation exercises, worry journaling).  Patient discussed recent psychosocial strains.  Also discussed distress over financial dealings with ex-.  Discussed current adaptation to disease and treatment status and family's adaptation to disease and treatment status. Reports to be coping with moderate difficulty. Evaluated cognitive response, paying particular attention to negative intrusive thoughts of a persistent and detrimental nature. Thoughts of this type are in evidence with moderate distress. Provided cognitive behavioral therapy to address negative cognitions. Identified and evaluated psychosocial and environmental stressors secondary to diagnosis and treatment.  Examined proactive behaviors that may be implemented to minimize or ameliorate psychosocial stressors secondary to diagnosis and treatment (exercise).     Risk parameters:   Patient reports no suicidal ideation  Patient reports no homicidal ideation  Patient reports no self-injurious behavior  Patient reports no violent behavior   Safety needs:  None at this time      Verbal deficits: None     Patient's response to intervention:The patient's response to intervention is accepting.     Progress toward goals and other mental status  changes:  The patient's progress toward goals is good.      Progress to date:Progress as Expected      Goals from last visit: Met      Patient reported outcomes:  Distress Thermometer:   Distress Score    Distress Score: 7        Practical Problems Physical Problems                                                   Family Problems                                         Emotional Problems                                                         Spiritual/Religions Concerns               Other Problems             PHQ-9= 15; 16 at inake   BRENDA-7= 15; 18 at intake      Client Strengths: verbal, intelligent, successful, good social support, good insight, commitment to wellness    Diagnosis:     ICD-10-CM ICD-9-CM   1. Generalized anxiety disorder F41.1 300.02   2. Moderate episode of recurrent major depressive disorder F33.1 296.32       Treatment Plan:individual psychotherapy and consult psychiatrist for medication evaluation  · Target symptoms: depression, anxiety   · Why chosen therapy is appropriate versus another modality: relevant to diagnosis, evidence based practice  · Outcome monitoring methods: self-report, checklist/rating scale  · Therapeutic intervention type: behavior modifying psychotherapy  · Prognosis: Excellent      Behavioral goals:    Exercise:  Regular exercise paired with school schedule   Stress management:  Relaxation exercises daily   Social engagement:   Nutrition:   Smoking Cessation:   Therapy:  Write down worries at night    [employer]    Talk to PCP about medication alternatives    Return to clinic: 2 weeks     Length of Service (minutes direct face-to-face contact): 45    Evelio Perez, PhD  [place] License #[id number]

## 2018-09-05 ENCOUNTER — TELEPHONE (OUTPATIENT)
Dept: PSYCHIATRY | Facility: CLINIC | Age: 48
End: 2018-09-05

## 2018-09-16 ENCOUNTER — PATIENT MESSAGE (OUTPATIENT)
Dept: PSYCHIATRY | Facility: CLINIC | Age: 48
End: 2018-09-16

## 2018-09-21 NOTE — PROGRESS NOTES
Subjective:       Patient ID: Allegra Reese is a 48 y.o. female.    Chief Complaint: No chief complaint on file.    HPI   Mrs. Reese returns today for follow up.   In late January 2017 she underwent a laparoscopic oophorectomies.  She remains on AIs.   Briefly, she is a 47-year-old  female from [place] who was diagnosed with breast cancer and on 04/27/2016 underwent bilateral mastectomies with reconstruction.   There was no invasive cancer or DCIS in the right breast; in the left breast she had three areas of an infiltrating ductal carcinoma measuring 11 mm, 5 mm, and 3 mm respectively.  One sentinel lymph node was positive and a full axillary dissection was performed with a total of five lymph nodes being positive.  She was tested and found to be negative for BRCA mutations.  Of note, her cancer was strongly ER and OH positive and HER 2 megative  She started adjuvant chemotherapy on June 24th 2016, and completed 4 cycles of AC and four cycles of paclitaxel.   She subsequently received XRT and was started on zoladex plus anastrazole.    Apparently she underwent a reconstruction on April 29, 2017, and two weeks alter she presented to the ED with SOB and was found to have PEs.  She was started on rivaroxaban, and is currently on 20 mg QHS.     She states that her PCp recently started her on lyrica 75 mg QHS, which has helped significantly with her neuropathic symptoms.     Review of Systems    Overall today she feels OK.    Her  ECOG PS remains 1.  She states that lately she has been experiencing dizziness.  No falls.   She denies any easy bruising, fevers, chills, night  sweats, weight loss, nausea, vomiting, diarrhea, constipation, diplopia, blurred vision, headaches, chest pain, palpitations, shortness of breath, breast pain, upper extremity pain, or difficulty ambulating.  The remainder of the ten-point ROS, including general, skin, lymph, H/N, cardiorespiratory, GI, , Neuro, Endocrine, and  psychiatric is negative.   Of note, she underwent removal of her port earlier today.    Objective:      Physical Exam    She is alert, oriented to time, place, person, pleasant, well      nourished, in no acute physical distress.                                    VITAL SIGNS:  Reviewed                                      HEENT: normal.  There are no nasal, oral, lip, gingival, auricular, lid,    or conjunctival lesions.  Mucosae are moist and pink, and there is no        thrush.  Pupils are equal, reactive to light and accommodation.              Extraocular muscle movements are intact.    Dentition is good.                                     NECK:  Supple without JVD, adenopathy, or thyromegaly.                       LUNGS:  Clear to auscultation without wheezing, rales, or rhonchi.           CARDIOVASCULAR:  Reveals an S1, S2, no murmurs, no rubs, no gallops.         ABDOMEN:  Soft, nontender, without organomegaly.  Bowel sounds are    present.   The abdominal incision has healed.                                                                 EXTREMITIES:  No cyanosis, clubbing, or edema.                               BREASTS:  She is status post bilateral mastectomies with free flap reconstructions.  The incisions have healed nicely.    There is a 2 cm hard nodule at the 9 o' clock position in the right reconstructed breast, representing fat necrosis.   This has been noted previously                        LYMPHATIC:  There is no cervical, axillary, or supraclavicular adenopathy.   SKIN:  Warm and moist, without petechiae, rashes, induration, or ecchymoses.  There is mild hyperpigmentation within the radiation field on the left chest wall.         NEUROLOGIC:  DTRs are 0-1+ bilaterally, symmetrical, motor function is 5/5,  and cranial nerves are  within normal limits.  Fundi are sharp without papilledema.     Assessment:       1. Ductal carcinoma in situ (DCIS) of left breast    2. Use of aromatase inhibitors      3.     PE, possibly related to the administration of AIs.   4.      New onset diziness of unclear etiology   Plan:          I had a long discussion with her;  She will remain on anastrazole for now, and she will complete her 5 years in December 2021.  In regards to her dizziness, out of abundance of caution we will proceed with a brain MRI ASAP, and if it is negative, I will call her with the results.   In regards to her PE, I recommended that she remain on rivaroxaban for the whole time that she is on AIs.   RTC 4 months.  Her questions were answered to her satisfaction.

## 2018-09-24 ENCOUNTER — TELEPHONE (OUTPATIENT)
Dept: HEMATOLOGY/ONCOLOGY | Facility: CLINIC | Age: 48
End: 2018-09-24

## 2018-09-24 ENCOUNTER — OFFICE VISIT (OUTPATIENT)
Dept: HEMATOLOGY/ONCOLOGY | Facility: CLINIC | Age: 48
End: 2018-09-24
Payer: MEDICAID

## 2018-09-24 ENCOUNTER — OFFICE VISIT (OUTPATIENT)
Dept: PSYCHIATRY | Facility: CLINIC | Age: 48
End: 2018-09-24
Payer: COMMERCIAL

## 2018-09-24 VITALS
SYSTOLIC BLOOD PRESSURE: 136 MMHG | RESPIRATION RATE: 20 BRPM | DIASTOLIC BLOOD PRESSURE: 63 MMHG | WEIGHT: 269.38 LBS | BODY MASS INDEX: 39.9 KG/M2 | TEMPERATURE: 98 F | HEART RATE: 81 BPM | OXYGEN SATURATION: 96 % | HEIGHT: 69 IN

## 2018-09-24 DIAGNOSIS — F33.1 MODERATE EPISODE OF RECURRENT MAJOR DEPRESSIVE DISORDER: ICD-10-CM

## 2018-09-24 DIAGNOSIS — Z79.811 USE OF AROMATASE INHIBITORS: ICD-10-CM

## 2018-09-24 DIAGNOSIS — D05.12 DUCTAL CARCINOMA IN SITU (DCIS) OF LEFT BREAST: Primary | ICD-10-CM

## 2018-09-24 DIAGNOSIS — F41.1 GENERALIZED ANXIETY DISORDER: Primary | ICD-10-CM

## 2018-09-24 PROCEDURE — 99999 PR PBB SHADOW E&M-EST. PATIENT-LVL II: CPT | Mod: PBBFAC,,, | Performed by: PSYCHOLOGIST

## 2018-09-24 PROCEDURE — 99212 OFFICE O/P EST SF 10 MIN: CPT | Mod: PBBFAC | Performed by: PSYCHOLOGIST

## 2018-09-24 PROCEDURE — 3008F BODY MASS INDEX DOCD: CPT | Mod: CPTII,S$GLB,, | Performed by: INTERNAL MEDICINE

## 2018-09-24 PROCEDURE — 90834 PSYTX W PT 45 MINUTES: CPT | Mod: S$GLB,,, | Performed by: PSYCHOLOGIST

## 2018-09-24 PROCEDURE — 99999 PR PBB SHADOW E&M-EST. PATIENT-LVL IV: CPT | Mod: PBBFAC,,, | Performed by: INTERNAL MEDICINE

## 2018-09-24 PROCEDURE — 99214 OFFICE O/P EST MOD 30 MIN: CPT | Mod: S$GLB,,, | Performed by: INTERNAL MEDICINE

## 2018-09-24 PROCEDURE — 90834 PSYTX W PT 45 MINUTES: CPT | Mod: PBBFAC | Performed by: PSYCHOLOGIST

## 2018-09-24 NOTE — TELEPHONE ENCOUNTER
Called patient back and scheduled her MRI for Wednesday. Patient stated that she would like to speak with a nurse because she is exteremly claustrophobic, message sent to nurse

## 2018-09-24 NOTE — TELEPHONE ENCOUNTER
----- Message from Darlyn Zhang sent at 9/24/2018  3:37 PM CDT -----  Patient Returning Call from [facility]    Who Left Message for Patient:smiley Velasco  Communication Preference:Phone [phone]  Additional Information:  Thank You   ANTONIA Zhang

## 2018-09-24 NOTE — PROGRESS NOTES
PSYCHO-ONCOLOGY NOTE/ Individual Psychotherapy     Date: 9/24/2018   Site:  [facility]        Therapeutic Intervention: Met with patient.  Outpatient - Behavior modifying psychotherapy 45 min - CPT code 76671    The patient's last visit with me was on 8/17/2018.    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    Malignant neoplasm of left breast    Right arm pain    Phlebitis of superficial vein of upper extremity    Essential hypertension    Moderate episode of recurrent major depressive disorder    Breast cancer    Neuropathy    Lymphedema    Pulmonary emboli    Use of aromatase inhibitors    Carcinoma of axillary tail of left breast in female, estrogen receptor positive    Anxiety about health    Generalized anxiety disorder       Chief complaint/reason for encounter: depression, anxiety and sleep   Met with patient to evaluate psychosocial adaptation to diagnosis/treatment/survivorship of breast cancer    Current Medications  Current Outpatient Medications   Medication    alprazolam (XANAX) 0.5 MG tablet    anastrozole (ARIMIDEX) 1 mg Tab    atorvastatin (LIPITOR) 20 MG tablet    citalopram (CELEXA) 20 MG tablet    meclizine (ANTIVERT) 25 mg tablet    metFORMIN (GLUCOPHAGE) 850 MG tablet    metoprolol succinate (TOPROL-XL) 25 MG 24 hr tablet    traMADol (ULTRAM) 50 mg tablet    XARELTO 20 mg Tab     No current facility-administered medications for this visit.        Objective:  Allegra Reese arrived promptly for the session.  Ms. Reese was independently ambulatory at the time of session. The patient was fully cooperative throughout the session.  Appearance: age appropriate,  casually dressed, adequately groomed  Behavior/Cooperation: friendly and cooperative  Speech: Not pressured, clearly audible, no slurring  Mood: anxious and depressed  Affect: mildly constricted, tearful  Thought Process: goal-directed, logical  Thought  Content: normal,  No delusions or paranoia; did not appear to be responding to internal stimuli during the session  Orientation: grossly intact  Memory: Grossly intact  Attention Span/Concentration: Attends to session without distraction; reports no difficulty  Fund of Knowledge: average  Estimate of Intelligence: average from verbal skills and history  Cognition: grossly intact  Insight: patient has awareness of illness; good insight into own behavior and behavior of others  Judgment: the patient's behavior is adequate to circumstances    Interval history and content of current session: Mrs. Reese discussed activities and social interactions since the time of last visit. Reviewed adaptation to school.  Patient struggling with time management.  Patient also discussed recent psychosocial strains.  Ongoing disruption in relationship with . Patient concerned about 's potential continued opioid use. Discussed needs not being met in relationship. Discussed current adaptation to disease and treatment status and family's adaptation to disease and treatment status. Reports to be coping with moderate difficulty. Concerned over dizziness and need for MRI.  Evaluated cognitive response, paying particular attention to negative intrusive thoughts of a persistent and detrimental nature. Thoughts of this type are in evidence with moderate distress.      Risk parameters:   Patient reports no suicidal ideation  Patient reports no homicidal ideation  Patient reports no self-injurious behavior  Patient reports no violent behavior   Safety needs:  None at this time      Verbal deficits: None     Patient's response to intervention:The patient's response to intervention is accepting.     Progress toward goals and other mental status changes:  The patient's progress toward goals is good.      Progress to date:Progress as Expected      Goals from last visit: partially Met      Patient reported outcomes:  Distress Thermometer:    Distress Score    Distress Score: 5        Practical Problems Physical Problems           Bathing / Dressing: Yes   Insurance / Financial: Yes              Work / School: Yes      Treatment Decisions: Yes             Family Problems             Dealing with Partner: Yes         Getting Around: Yes    Family Health Issues: Yes        Memory / Concentration: Yes   Emotional Problems      Depression: Yes       Fears: Yes                      Worry: Yes Skin Dry / Itchy: Yes                 Spiritual/Religions Concerns Tingling in Hands / Feet: Yes             Other Problems             PHQ-9= 7; 16 at inake   BRENDA-7= 9; 18 at intake      Client Strengths: verbal, intelligent, successful, good social support, good insight, commitment to wellness    Diagnosis:     ICD-10-CM ICD-9-CM   1. Generalized anxiety disorder F41.1 300.02   2. Moderate episode of recurrent major depressive disorder F33.1 296.32       Treatment Plan:individual psychotherapy and consult psychiatrist for medication evaluation  · Target symptoms: depression, anxiety   · Why chosen therapy is appropriate versus another modality: relevant to diagnosis, evidence based practice  · Outcome monitoring methods: self-report, checklist/rating scale  · Therapeutic intervention type: behavior modifying psychotherapy  · Prognosis: Excellent      Behavioral goals:    Exercise:  Regular exercise   Stress management:  Relaxation exercises daily   Social engagement:   Nutrition:   Smoking Cessation:   Therapy:  Social interactions    [employer]    Talk to  about marital therapy/needs in relationship    Return to clinic: 3 weeks     Length of Service (minutes direct face-to-face contact): 45    Evelio Perez, PhD  [place] License #[id number]

## 2018-09-24 NOTE — TELEPHONE ENCOUNTER
----- Message from Conner Lucas MD sent at 9/24/2018  2:13 PM CDT -----  needs a brain MRI ASAP please

## 2018-09-25 ENCOUNTER — TELEPHONE (OUTPATIENT)
Dept: HEMATOLOGY/ONCOLOGY | Facility: CLINIC | Age: 48
End: 2018-09-25

## 2018-09-25 NOTE — TELEPHONE ENCOUNTER
Attempted to contact patient on her cell phone to discuss her concerns prior to her MRI tomorrow, pt with no answer, no voicemail available.    Then proceeded to call house number, no answer, VM left to have patient call back at her earliest convenience.

## 2018-09-26 ENCOUNTER — HOSPITAL ENCOUNTER (OUTPATIENT)
Dept: RADIOLOGY | Facility: HOSPITAL | Age: 48
Discharge: HOME | End: 2018-09-26
Attending: INTERNAL MEDICINE
Payer: COMMERCIAL

## 2018-09-26 DIAGNOSIS — D05.12 DUCTAL CARCINOMA IN SITU (DCIS) OF LEFT BREAST: ICD-10-CM

## 2018-09-26 DIAGNOSIS — Z79.811 USE OF AROMATASE INHIBITORS: ICD-10-CM

## 2018-09-26 PROCEDURE — 70553 MRI BRAIN STEM W/O & W/DYE: CPT | Mod: TC

## 2018-09-26 PROCEDURE — A9585 GADOBUTROL INJECTION: HCPCS | Performed by: INTERNAL MEDICINE

## 2018-09-26 PROCEDURE — 25500020 PHARM REV CODE 255: Performed by: INTERNAL MEDICINE

## 2018-09-26 PROCEDURE — 70553 MRI BRAIN STEM W/O & W/DYE: CPT | Mod: 26,,, | Performed by: RADIOLOGY

## 2018-09-26 RX ORDER — ANASTROZOLE 1 MG/1
1 TABLET ORAL DAILY
Qty: 90 TABLET | Refills: 2 | Status: SHIPPED | OUTPATIENT
Start: 2018-09-26 | End: 2019-06-25 | Stop reason: SDUPTHER

## 2018-09-26 RX ORDER — GADOBUTROL 604.72 MG/ML
10 INJECTION INTRAVENOUS
Status: COMPLETED | OUTPATIENT
Start: 2018-09-26 | End: 2018-09-26

## 2018-09-26 RX ADMIN — GADOBUTROL 10 ML: 604.72 INJECTION INTRAVENOUS at 07:09

## 2018-09-26 NOTE — TELEPHONE ENCOUNTER
Contacted patient to inform per Dr. Lloyd that MRI of brain came back without any evidence of metastatic disease. Pt verbalized that procedure was tolerated well. Requested refill of arimidex at this time, refill request submitted, pt notified. No other questions or concerns at this time.

## 2018-09-26 NOTE — TELEPHONE ENCOUNTER
----- Message from Conner Lucas MD sent at 9/26/2018  8:24 AM CDT -----  Good morning Sharita,  Can you please let her know that her brain MRI was OK?

## 2018-10-19 ENCOUNTER — TELEPHONE (OUTPATIENT)
Dept: PSYCHIATRY | Facility: CLINIC | Age: 48
End: 2018-10-19

## 2018-10-19 NOTE — TELEPHONE ENCOUNTER
Spoke to patient re: no show 10/19/18.  Patient without transportation today. Rescheduled for 10/24/18.   GILDA Perez, PhD

## 2018-10-24 ENCOUNTER — OFFICE VISIT (OUTPATIENT)
Dept: PSYCHIATRY | Facility: CLINIC | Age: 48
End: 2018-10-24
Payer: COMMERCIAL

## 2018-10-24 DIAGNOSIS — F33.1 MODERATE EPISODE OF RECURRENT MAJOR DEPRESSIVE DISORDER: Primary | ICD-10-CM

## 2018-10-24 DIAGNOSIS — F41.1 GENERALIZED ANXIETY DISORDER: ICD-10-CM

## 2018-10-24 PROCEDURE — 90834 PSYTX W PT 45 MINUTES: CPT | Mod: S$GLB,,, | Performed by: PSYCHOLOGIST

## 2018-10-24 PROCEDURE — 99999 PR PBB SHADOW E&M-EST. PATIENT-LVL II: CPT | Mod: PBBFAC,,, | Performed by: PSYCHOLOGIST

## 2018-10-24 NOTE — PROGRESS NOTES
PSYCHO-ONCOLOGY NOTE/ Individual Psychotherapy     Date: 10/24/2018   Site:  [facility]        Therapeutic Intervention: Met with patient.  Outpatient - Behavior modifying psychotherapy 45 min - CPT code 91251    The patient's last visit with me was on 9/24/2018.    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    Malignant neoplasm of left breast    Right arm pain    Phlebitis of superficial vein of upper extremity    Essential hypertension    Moderate episode of recurrent major depressive disorder    Breast cancer    Neuropathy    Lymphedema    Pulmonary emboli    Use of aromatase inhibitors    Carcinoma of axillary tail of left breast in female, estrogen receptor positive    Anxiety about health    Generalized anxiety disorder       Chief complaint/reason for encounter: depression, anxiety and sleep   Met with patient to evaluate psychosocial adaptation to diagnosis/treatment/survivorship of breast cancer    Current Medications  Current Outpatient Medications   Medication    alprazolam (XANAX) 0.5 MG tablet    anastrozole (ARIMIDEX) 1 mg Tab    atorvastatin (LIPITOR) 20 MG tablet    citalopram (CELEXA) 20 MG tablet    meclizine (ANTIVERT) 25 mg tablet    metFORMIN (GLUCOPHAGE) 850 MG tablet    metoprolol succinate (TOPROL-XL) 25 MG 24 hr tablet    traMADol (ULTRAM) 50 mg tablet    XARELTO 20 mg Tab     No current facility-administered medications for this visit.        Objective:  Allegra Reese arrived promptly for the session.  Ms. Reese was independently ambulatory at the time of session. The patient was fully cooperative throughout the session.  Appearance: age appropriate,  casually dressed, adequately groomed  Behavior/Cooperation: friendly and cooperative  Speech: Not pressured, clearly audible, no slurring  Mood: anxious and depressed  Affect: mildly constricted, tearful  Thought Process: goal-directed, logical  Thought  Content: normal,  No delusions or paranoia; did not appear to be responding to internal stimuli during the session  Orientation: grossly intact  Memory: Grossly intact  Attention Span/Concentration: Attends to session without distraction; reports no difficulty  Fund of Knowledge: average  Estimate of Intelligence: average from verbal skills and history  Cognition: grossly intact  Insight: patient has awareness of illness; good insight into own behavior and behavior of others  Judgment: the patient's behavior is adequate to circumstances    Interval history and content of current session: Mrs. Reese discussed activities and social interactions since the time of last visit. Patient discussed ongoing disruption in relationship with . Discussed needs not being met in relationship. Importance of finding ways to meet own needs stressed. Patient also processed anger at former employer due to perceived lip service to breast cancer support.  Discussed current adaptation to disease and treatment status and family's adaptation to disease and treatment status. Reports to be coping with moderate difficulty. Evaluated cognitive response, paying particular attention to negative intrusive thoughts of a persistent and detrimental nature. Thoughts of this type are in evidence with moderate distress.      Risk parameters:   Patient reports no suicidal ideation  Patient reports no homicidal ideation  Patient reports no self-injurious behavior  Patient reports no violent behavior   Safety needs:  None at this time      Verbal deficits: None     Patient's response to intervention:The patient's response to intervention is accepting.     Progress toward goals and other mental status changes:  The patient's progress toward goals is good.      Progress to date:Progress as Expected      Goals from last visit: partially Met      Patient reported outcomes:  Distress Thermometer:   Distress Score    Distress Score: 5        Practical  Problems Physical Problems                                                   Family Problems                                         Emotional Problems                                                         Spiritual/Religions Concerns               Other Problems             PHQ-9= 11; 16 at inake   BRENDA-7= 14; 18 at intake      Client Strengths: verbal, intelligent, successful, good social support, good insight, commitment to wellness    Diagnosis:     ICD-10-CM ICD-9-CM   1. Generalized anxiety disorder F41.1 300.02   2. Moderate episode of recurrent major depressive disorder F33.1 296.32       Treatment Plan:individual psychotherapy and consult psychiatrist for medication evaluation  · Target symptoms: depression, anxiety   · Why chosen therapy is appropriate versus another modality: relevant to diagnosis, evidence based practice  · Outcome monitoring methods: self-report, checklist/rating scale  · Therapeutic intervention type: behavior modifying psychotherapy  · Prognosis: Excellent      Behavioral goals:    Exercise:  Regular exercise   Stress management:  Relaxation exercises daily, journaling   Social engagement:   Nutrition:   Smoking Cessation:   Therapy:  Social interactions    [employer]    Talk to  about marital therapy/needs in relationship    Return to clinic: 3 weeks     Length of Service (minutes direct face-to-face contact): 45    Evelio Perez, PhD  [place] License #[id number]

## 2018-11-12 ENCOUNTER — TELEPHONE (OUTPATIENT)
Dept: PSYCHIATRY | Facility: CLINIC | Age: 48
End: 2018-11-12

## 2018-11-12 NOTE — TELEPHONE ENCOUNTER
Called all 3 numbers associated with patient. Left message re: no show 11/12/18. Requested call back from patient to reschedule.   GILDA Perez, PhD

## 2019-01-27 ENCOUNTER — HOSPITAL ENCOUNTER (EMERGENCY)
Facility: HOSPITAL | Age: 49
Discharge: HOME | End: 2019-01-27
Attending: EMERGENCY MEDICINE
Payer: COMMERCIAL

## 2019-01-27 VITALS
BODY MASS INDEX: 41.39 KG/M2 | OXYGEN SATURATION: 97 % | HEART RATE: 104 BPM | HEIGHT: 69 IN | RESPIRATION RATE: 20 BRPM | DIASTOLIC BLOOD PRESSURE: 92 MMHG | TEMPERATURE: 98 F | SYSTOLIC BLOOD PRESSURE: 130 MMHG | WEIGHT: 279.44 LBS

## 2019-01-27 DIAGNOSIS — R73.9 HYPERGLYCEMIA: Primary | ICD-10-CM

## 2019-01-27 LAB — POCT GLUCOSE: 192 MG/DL (ref 70–110)

## 2019-01-27 PROCEDURE — 99283 EMERGENCY DEPT VISIT LOW MDM: CPT | Mod: 25,ER

## 2019-01-27 PROCEDURE — 93005 ELECTROCARDIOGRAM TRACING: CPT | Mod: ER

## 2019-01-27 RX ORDER — METFORMIN HYDROCHLORIDE 500 MG/1
500 TABLET ORAL
Qty: 30 TABLET | Refills: 1 | Status: SHIPPED | OUTPATIENT
Start: 2019-01-27 | End: 2019-05-16 | Stop reason: DRUGHIGH

## 2019-01-27 RX ORDER — METFORMIN HYDROCHLORIDE 1000 MG/1
1000 TABLET ORAL 2 TIMES DAILY
Qty: 60 TABLET | Refills: 1 | Status: SHIPPED | OUTPATIENT
Start: 2019-01-27 | End: 2020-01-27

## 2019-01-27 NOTE — ED PROVIDER NOTES
"Encounter Date: 1/27/2019       History     Chief Complaint   Patient presents with    Hyperglycemia     c/o high blood sugar, stating "not going down pass 190"     Blood sugars running from the 190s to the mid 200s. 192 on arrival.   No symptoms.  Has not been fully compliant with her metformin and has been stress eating with significant excess sugars in carbohydrates.  Counseled in detail, no emergency condition.  Will increase her metformin from present 750 b.i.d. to full dosing and recommend continued dietary management and outpatient follow-up.  No other complaints.      The history is provided by the patient. No  was used.     Review of patient's allergies indicates:   Allergen Reactions    Adhesive      blisters     Past Medical History:   Diagnosis Date    Anxiety     Breast cancer 2016    left    Breast cyst     Cancer     Depression     History of psychiatric hospitalization     [facility] in [place] 2014 due to suicidality    Hx of psychiatric care     Celexa, Wellbutrin, Topamax    Hypertension     Psychiatric exam requested by authority     Psychiatric problem     PTSD (post-traumatic stress disorder)     Sleep difficulties     Therapy      Past Surgical History:   Procedure Laterality Date    BIOPSY-SENTINEL NODE Left 4/27/2016    [facility]    BREAST BIOPSY Left 2016    BREAST RECONSTRUCTION Bilateral 2016    wan flap reconstruction bilat    CYST REMOVAL Left     breast    WAN FREE FLAP Bilateral 4/27/2016    [facility]ady [facility]    DISSECTION-AXILLARY LYMPH NODE Left 4/27/2016    [facility]    EXCISION-DUCT Left Nipple Left 2/29/2016    [facility]    HYSTERECTOMY      INJECTION-SENTINEL NODE Left 4/27/2016    Performed by [facility]    [id number]-U-CATH - neck or chest Right 6/6/2016    Performed by " [facility]    MASTECTOMY Bilateral 2016    MASTECTOMY Bilateral 4/27/2016    [facility]    SHOULDER SURGERY Right      Family History   Problem Relation Age of Onset    Cancer Paternal Aunt     Breast cancer Paternal Aunt     Cancer Paternal Grandmother     Breast cancer Paternal Grandmother     Cancer Paternal Aunt     Breast cancer Other      Social History     Tobacco Use    Smoking status: Never Smoker    Smokeless tobacco: Never Used   Substance Use Topics    Alcohol use: Yes     Alcohol/week: 0.0 oz     Comment: socially    Drug use: No     Review of Systems   Constitutional: Negative for activity change, fatigue and fever.   HENT: Negative for congestion, ear pain, facial swelling, nosebleeds, sinus pressure and sore throat.    Eyes: Negative for pain, discharge, redness and visual disturbance.   Respiratory: Negative for cough, choking, chest tightness, shortness of breath and wheezing.    Cardiovascular: Negative for chest pain, palpitations and leg swelling.   Gastrointestinal: Negative for abdominal distention, abdominal pain, nausea and vomiting.   Endocrine: Negative for heat intolerance, polydipsia and polyuria.   Genitourinary: Negative for difficulty urinating, dysuria, flank pain, hematuria and urgency.   Musculoskeletal: Negative for back pain, gait problem, joint swelling and myalgias.   Skin: Negative for color change and rash.   Allergic/Immunologic: Negative for environmental allergies and food allergies.   Neurological: Negative for dizziness, weakness, numbness and headaches.   Hematological: Negative for adenopathy. Does not bruise/bleed easily.   Psychiatric/Behavioral: Negative for agitation and behavioral problems. The patient is not nervous/anxious.    All other systems reviewed and are negative.      Physical Exam     Initial Vitals [01/27/19 1415]   BP Pulse Resp Temp SpO2   (!) 130/92 104 20 97.6 °F (36.4  °C) 97 %      MAP       --         Physical Exam    Nursing note and vitals reviewed.  Constitutional: She appears well-developed and well-nourished. She is not diaphoretic. No distress.   HENT:   Head: Normocephalic and atraumatic.   Mouth/Throat: No oropharyngeal exudate.   Eyes: Conjunctivae and EOM are normal. Pupils are equal, round, and reactive to light. Right eye exhibits no discharge. Left eye exhibits no discharge. No scleral icterus.   Neck: Normal range of motion. Neck supple. No thyromegaly present. No tracheal deviation present. No JVD present.   Cardiovascular: Normal rate, regular rhythm, normal heart sounds and intact distal pulses. Exam reveals no gallop and no friction rub.    No murmur heard.  Pulmonary/Chest: Breath sounds normal. No stridor. No respiratory distress. She has no wheezes. She has no rhonchi. She has no rales. She exhibits no tenderness.   Abdominal: Soft. Bowel sounds are normal. She exhibits no distension and no mass. There is no tenderness. There is no rebound and no guarding.   Musculoskeletal: Normal range of motion. She exhibits no edema or tenderness.   Neurological: She is alert and oriented to person, place, and time. She has normal strength.   Skin: Skin is warm and dry. No rash and no abscess noted. No erythema.   Psychiatric: She has a normal mood and affect. Her behavior is normal. Judgment and thought content normal.         ED Course   Procedures  Labs Reviewed   POCT GLUCOSE - Abnormal; Notable for the following components:       Result Value    POCT Glucose 192 (*)     All other components within normal limits          Imaging Results    None                               Clinical Impression:       1. Hyperglycemia          Disposition:   Disposition: Discharged  Condition: Stable                        Angelo Durbin MD  01/27/19 2384

## 2019-01-27 NOTE — DISCHARGE INSTRUCTIONS
___________      As discussed, increase metformin to 1000 mg in the morning, 500 with lunch and 1000 in the evening.  Follow up with your doctor regarding blood sugar control next 2 weeks or so.  Focus on dietary control sugars and carbohydrates.    __________

## 2019-01-27 NOTE — ED NOTES
Patient reports that she has not been able to get her glucose under 190. She also reports she has been using two different type of monitors and does not know which to believe. BBSCAT, skin warm and dry resp even and unlabored. Patient in NAD will continue to monitor.

## 2019-01-28 ENCOUNTER — OFFICE VISIT (OUTPATIENT)
Dept: HEMATOLOGY/ONCOLOGY | Facility: CLINIC | Age: 49
End: 2019-01-28
Payer: COMMERCIAL

## 2019-01-28 VITALS
BODY MASS INDEX: 41.43 KG/M2 | HEIGHT: 69 IN | TEMPERATURE: 98 F | WEIGHT: 279.75 LBS | OXYGEN SATURATION: 96 % | HEART RATE: 93 BPM | RESPIRATION RATE: 18 BRPM | SYSTOLIC BLOOD PRESSURE: 135 MMHG | DIASTOLIC BLOOD PRESSURE: 99 MMHG

## 2019-01-28 DIAGNOSIS — Z17.0 CARCINOMA OF AXILLARY TAIL OF LEFT BREAST IN FEMALE, ESTROGEN RECEPTOR POSITIVE: Primary | ICD-10-CM

## 2019-01-28 DIAGNOSIS — C50.612 CARCINOMA OF AXILLARY TAIL OF LEFT BREAST IN FEMALE, ESTROGEN RECEPTOR POSITIVE: Primary | ICD-10-CM

## 2019-01-28 DIAGNOSIS — Z79.811 USE OF AROMATASE INHIBITORS: ICD-10-CM

## 2019-01-28 PROCEDURE — 99999 PR PBB SHADOW E&M-EST. PATIENT-LVL III: ICD-10-PCS | Mod: PBBFAC,,, | Performed by: INTERNAL MEDICINE

## 2019-01-28 PROCEDURE — 3008F BODY MASS INDEX DOCD: CPT | Mod: CPTII,S$GLB,, | Performed by: INTERNAL MEDICINE

## 2019-01-28 PROCEDURE — 99999 PR PBB SHADOW E&M-EST. PATIENT-LVL III: CPT | Mod: PBBFAC,,, | Performed by: INTERNAL MEDICINE

## 2019-01-28 PROCEDURE — 99214 PR OFFICE/OUTPT VISIT, EST, LEVL IV, 30-39 MIN: ICD-10-PCS | Mod: S$GLB,,, | Performed by: INTERNAL MEDICINE

## 2019-01-28 PROCEDURE — 3008F PR BODY MASS INDEX (BMI) DOCUMENTED: ICD-10-PCS | Mod: CPTII,S$GLB,, | Performed by: INTERNAL MEDICINE

## 2019-01-28 PROCEDURE — 99214 OFFICE O/P EST MOD 30 MIN: CPT | Mod: S$GLB,,, | Performed by: INTERNAL MEDICINE

## 2019-01-28 RX ORDER — GABAPENTIN 300 MG/1
CAPSULE ORAL
COMMUNITY
End: 2023-03-22

## 2019-01-28 RX ORDER — LISINOPRIL AND HYDROCHLOROTHIAZIDE 10; 12.5 MG/1; MG/1
TABLET ORAL
COMMUNITY
End: 2021-03-22

## 2019-01-28 RX ORDER — PREGABALIN 75 MG/1
CAPSULE ORAL
COMMUNITY
Start: 2018-09-13 | End: 2023-03-22

## 2019-01-28 RX ORDER — BUPROPION HYDROCHLORIDE 150 MG/1
TABLET, EXTENDED RELEASE ORAL
COMMUNITY
End: 2021-03-22

## 2019-01-28 RX ORDER — BENZONATATE 200 MG/1
CAPSULE ORAL
Refills: 1 | COMMUNITY
Start: 2019-01-11 | End: 2023-03-22

## 2019-01-28 RX ORDER — TRAMADOL HYDROCHLORIDE 50 MG/1
TABLET ORAL
COMMUNITY
End: 2023-03-22

## 2019-01-28 RX ORDER — CEFPROZIL 250 MG/1
TABLET, FILM COATED ORAL
Refills: 0 | COMMUNITY
Start: 2019-01-11 | End: 2020-01-03 | Stop reason: ALTCHOICE

## 2019-01-28 NOTE — PROGRESS NOTES
Subjective:       Patient ID: Allegra Reese is a 48 y.o. female.    Chief Complaint: Malignant neoplasm of nipple of left breast in female, estro    HPI   Mrs. Reese returns today for follow up.   In late January 2017 she underwent a laparoscopic oophorectomies.  She remains on AIs.   Briefly, she is a 48-year-old  female from [place] who was diagnosed with breast cancer and on 04/27/2016 underwent bilateral mastectomies with reconstruction.   There was no invasive cancer or DCIS in the right breast; in the left breast she had three areas of an infiltrating ductal carcinoma measuring 11 mm, 5 mm, and 3 mm respectively.  One sentinel lymph node was positive and a full axillary dissection was performed with a total of five lymph nodes being positive.  She was tested and found to be negative for BRCA mutations.  Of note, her cancer was strongly ER and VA positive and HER 2 megative  She started adjuvant chemotherapy on June 24th 2016, and completed 4 cycles of AC and four cycles of paclitaxel.   She subsequently received XRT and was started on zoladex plus anastrazole.    Apparently she underwent a reconstruction on April 29, 2017, and two weeks alter she presented to the ED with SOB and was found to have PEs.  She was started on rivaroxaban, and is currently on 20 mg QHS.     She states that her PPp recently started her on lyrica 75 mg QHS, which has helped significantly with her neuropathic symptoms.     Review of Systems    Overall today she feels OK.    Her  ECOG PS remains 1.  She does complain of finger pain and stiffness and occasional hot flashes, but she denies any easy bruising, fevers, chills, night  sweats, weight loss, nausea, vomiting, diarrhea, constipation, diplopia, blurred vision, headaches, chest pain, palpitations, shortness of breath, breast pain, upper extremity pain, or difficulty ambulating.  The remainder of the ten-point ROS, including general, skin, lymph, H/N,  cardiorespiratory, GI, , Neuro, Endocrine, and psychiatric is negative.   Of note, she underwent removal of her port earlier today.    Objective:      Physical Exam    She is alert, oriented to time, place, person, pleasant, well      nourished, in no acute physical distress.                                    VITAL SIGNS:  Reviewed                                      HEENT: normal.  There are no nasal, oral, lip, gingival, auricular, lid,    or conjunctival lesions.  Mucosae are moist and pink, and there is no        thrush.  Pupils are equal, reactive to light and accommodation.              Extraocular muscle movements are intact.    Dentition is good.                                     NECK:  Supple without JVD, adenopathy, or thyromegaly.                       LUNGS:  Clear to auscultation without wheezing, rales, or rhonchi.           CARDIOVASCULAR:  Reveals an S1, S2, no murmurs, no rubs, no gallops.         ABDOMEN:  Soft, nontender, without organomegaly.  Bowel sounds are    present.   The abdominal incision has healed.                                                                 EXTREMITIES:  No cyanosis, clubbing, or edema.                               BREASTS:  She is status post bilateral mastectomies with free flap reconstructions.  The incisions have healed nicely.    There is a 2 cm hard nodule at the 9 o' clock position in the right reconstructed breast, representing fat necrosis.   This has been noted previously                        LYMPHATIC:  There is no cervical, axillary, or supraclavicular adenopathy.   SKIN:  Warm and moist, without petechiae, rashes, induration, or ecchymoses.  There is mild hyperpigmentation within the radiation field on the left chest wall.         NEUROLOGIC:  DTRs are 0-1+ bilaterally, symmetrical, motor function is 5/5,  and cranial nerves are  within normal limits.  Fundi are sharp without papilledema.     Assessment:       1. 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 Use of aromatase inhibitors     3.     PE, possibly related to the administration of AIs.     Plan:          I had a long discussion with her;  She will remain on anastrazole for now, and she will complete her 5 years in December 2021.  In regards to her PE, I recommended that she remain on rivaroxaban for the whole time that she is on AIs.   RTC 4 months with a DXA scan.  Her questions were answered to her satisfaction.

## 2019-01-30 ENCOUNTER — DOCUMENTATION ONLY (OUTPATIENT)
Dept: PSYCHIATRY | Facility: CLINIC | Age: 49
End: 2019-01-30

## 2019-02-27 ENCOUNTER — PATIENT MESSAGE (OUTPATIENT)
Dept: PSYCHIATRY | Facility: CLINIC | Age: 49
End: 2019-02-27

## 2019-03-18 ENCOUNTER — PATIENT MESSAGE (OUTPATIENT)
Dept: PSYCHIATRY | Facility: CLINIC | Age: 49
End: 2019-03-18

## 2019-04-02 ENCOUNTER — HOSPITAL ENCOUNTER (OUTPATIENT)
Dept: RADIOLOGY | Facility: HOSPITAL | Age: 49
Discharge: HOME | End: 2019-04-02
Attending: INTERNAL MEDICINE
Payer: COMMERCIAL

## 2019-04-02 DIAGNOSIS — Z79.811 USE OF AROMATASE INHIBITORS: ICD-10-CM

## 2019-04-02 DIAGNOSIS — Z17.0 CARCINOMA OF AXILLARY TAIL OF LEFT BREAST IN FEMALE, ESTROGEN RECEPTOR POSITIVE: ICD-10-CM

## 2019-04-02 DIAGNOSIS — C50.612 CARCINOMA OF AXILLARY TAIL OF LEFT BREAST IN FEMALE, ESTROGEN RECEPTOR POSITIVE: ICD-10-CM

## 2019-04-02 PROCEDURE — 77080 DXA BONE DENSITY AXIAL: CPT | Mod: TC

## 2019-04-03 ENCOUNTER — PATIENT MESSAGE (OUTPATIENT)
Dept: HEMATOLOGY/ONCOLOGY | Facility: CLINIC | Age: 49
End: 2019-04-03

## 2019-04-03 ENCOUNTER — PATIENT MESSAGE (OUTPATIENT)
Dept: RADIATION ONCOLOGY | Facility: CLINIC | Age: 49
End: 2019-04-03

## 2019-05-15 NOTE — PROGRESS NOTES
Subjective:       Patient ID: Allegra Reese is a 49 y.o. female.    Chief Complaint: Follow-up    HPI   Mrs. Reese returns today for follow up. She remains on AIs.       Briefly, she is a 49-year-old  female from [place] who was diagnosed with breast cancer and on 04/27/2016 underwent bilateral mastectomies with reconstruction.   There was no invasive cancer or DCIS in the right breast; in the left breast she had three areas of an infiltrating ductal carcinoma measuring 11 mm, 5 mm, and 3 mm respectively.  One sentinel lymph node was positive and a full axillary dissection was performed with a total of five lymph nodes being positive.  She was tested and found to be negative for BRCA mutations.  Of note, her cancer was strongly ER and AR positive and HER 2 negative  She started adjuvant chemotherapy on June 24th 2016, and completed 4 cycles of AC and four cycles of paclitaxel.   She subsequently received XRT and was started on zoladex plus anastrazole.    In late January 2017 she underwent a laparoscopic oophorectomies.     Apparently she underwent a reconstruction on April 29, 2017, and two weeks alter she presented to the ED with SOB and was found to have PEs.  She was started on rivaroxaban, and is currently on 20 mg QHS.        She states that her PCP  started her on lyrica 75 mg BID, which has helped significantly with her neuropathic symptoms.     BMD 4/2/19 revealed Osteopenia.  She is taking Vit D daily    Review of Systems    Overall today she feels OK. She notes a hardness and pain in left breast when she lays on her left side. Right breast slightly tender as well.  She continues to have hand joint stiffness but no worse. Neuropathy to feet improved with lyrica. Occasional hot flashes. No bleeding/bruising. No fevers, chills, night  sweats, weight loss, nausea, vomiting, diarrhea, constipation, diplopia, blurred vision, headaches, chest pain, palpitations, shortness of breath, breast  pain, upper extremity pain, or difficulty ambulating.  The remainder of the ten-point ROS, including general, skin, lymph, H/N, cardiorespiratory, GI, , Neuro, Endocrine, and psychiatric is negative.       Objective:      Physical Exam    She is alert, oriented to time, place, person, pleasant, well      nourished, in no acute physical distress.                                    VITAL SIGNS:  Reviewed                                      HEENT: normal.  There are no nasal, oral, lip, gingival, auricular, lid,    or conjunctival lesions.  Mucosae are moist and pink, and there is no        thrush.  Pupils are equal, reactive to light and accommodation.              Extraocular muscle movements are intact.    Dentition is good.                                     NECK:  Supple without JVD, adenopathy, or thyromegaly.                       LUNGS:  Clear to auscultation without wheezing, rales, or rhonchi.           CARDIOVASCULAR:  Reveals an S1, S2, no murmurs, no rubs, no gallops.         ABDOMEN:  Soft, nontender, without organomegaly.  Bowel sounds are    present.   The abdominal incision has healed.                                                                 EXTREMITIES:  No cyanosis, clubbing, or edema.                               BREASTS:  She is status post bilateral mastectomies with free flap reconstructions.  The incisions have healed nicely. Tenderness to left lateral breast- firmness noted as well.  There is a 2 cm hard nodule at the 9 o' clock position in the right reconstructed breast, representing fat necrosis. This has been noted previously. Tenderness to right breast upon palpation as well which is new.                     LYMPHATIC:  There is no cervical, axillary, or supraclavicular adenopathy.   SKIN:  Warm and moist, without petechiae, rashes, induration, or ecchymoses.  There is mild hyperpigmentation within the radiation field on the left chest wall.         NEUROLOGIC:  DTRs are 0-1+  bilaterally, symmetrical, motor function is 5/5,  and cranial nerves are  within normal limits.  Fundi are sharp without papilledema.     Assessment:       1. Breast pain    2. Vitamin D deficiency    3. Ductal carcinoma in situ (DCIS) of left breast     3.     PE, possibly related to the administration of AIs.     Plan:           -Doing well.   -Some tenderness noted to palpation more so on left breast.area of firmness noted to left breast, not previously documented.  Will obtain mammogram today.   -Continue anastrazole, she will complete her 5 years in December 2021.  -In regards to her PE, I recommended that she remain on rivaroxaban for the whole time that she is on AIs.   -She is due for a Vit D level. taking calcium and Vit D. Will check level today.   RTC 4 months to see Dr. Lloyd    Patient is in agreement with the proposed treatment plan. All questions were answered to the patient's satisfaction. Pt knows to call clinic for any new or worsening symptoms and if anything is needed before the next clinic visit.      CARLOS Lal-JUDD  Hematology & Oncology  [address] [place] [id number]  ph. [phone]  Fax. [phone]     I spent 30 minutes (face to face) with the patient, more than 50% was in counseling and coordination of care as detailed above.            Distress Screening Results: Psychosocial Distress screening score of Distress Score: 2 noted and reviewed. No intervention indicated.

## 2019-05-16 ENCOUNTER — OFFICE VISIT (OUTPATIENT)
Dept: HEMATOLOGY/ONCOLOGY | Facility: CLINIC | Age: 49
End: 2019-05-16
Payer: COMMERCIAL

## 2019-05-16 ENCOUNTER — HOSPITAL ENCOUNTER (OUTPATIENT)
Dept: RADIOLOGY | Facility: HOSPITAL | Age: 49
Discharge: HOME | End: 2019-05-16
Attending: NURSE PRACTITIONER
Payer: COMMERCIAL

## 2019-05-16 VITALS
SYSTOLIC BLOOD PRESSURE: 133 MMHG | HEIGHT: 69 IN | OXYGEN SATURATION: 96 % | TEMPERATURE: 98 F | WEIGHT: 272.69 LBS | BODY MASS INDEX: 40.39 KG/M2 | RESPIRATION RATE: 16 BRPM | DIASTOLIC BLOOD PRESSURE: 69 MMHG | HEART RATE: 80 BPM

## 2019-05-16 DIAGNOSIS — N64.4 BREAST PAIN: ICD-10-CM

## 2019-05-16 DIAGNOSIS — D05.12 DUCTAL CARCINOMA IN SITU (DCIS) OF LEFT BREAST: ICD-10-CM

## 2019-05-16 DIAGNOSIS — N64.4 BREAST PAIN: Primary | ICD-10-CM

## 2019-05-16 DIAGNOSIS — E55.9 VITAMIN D DEFICIENCY: ICD-10-CM

## 2019-05-16 PROCEDURE — 77062 MAMMO DIGITAL DIAGNOSTIC BILAT WITH TOMOSYNTHESIS_CAD: ICD-10-PCS | Mod: 26,,, | Performed by: RADIOLOGY

## 2019-05-16 PROCEDURE — 99999 PR PBB SHADOW E&M-EST. PATIENT-LVL IV: ICD-10-PCS | Mod: PBBFAC,,, | Performed by: NURSE PRACTITIONER

## 2019-05-16 PROCEDURE — 77062 BREAST TOMOSYNTHESIS BI: CPT | Mod: 26,,, | Performed by: RADIOLOGY

## 2019-05-16 PROCEDURE — 76642 ULTRASOUND BREAST LIMITED: CPT | Mod: 26,,, | Performed by: RADIOLOGY

## 2019-05-16 PROCEDURE — 77066 MAMMO DIGITAL DIAGNOSTIC BILAT WITH TOMOSYNTHESIS_CAD: ICD-10-PCS | Mod: 26,,, | Performed by: RADIOLOGY

## 2019-05-16 PROCEDURE — 3008F PR BODY MASS INDEX (BMI) DOCUMENTED: ICD-10-PCS | Mod: CPTII,S$GLB,, | Performed by: NURSE PRACTITIONER

## 2019-05-16 PROCEDURE — 77066 DX MAMMO INCL CAD BI: CPT | Mod: 26,,, | Performed by: RADIOLOGY

## 2019-05-16 PROCEDURE — 3008F BODY MASS INDEX DOCD: CPT | Mod: CPTII,S$GLB,, | Performed by: NURSE PRACTITIONER

## 2019-05-16 PROCEDURE — 99214 PR OFFICE/OUTPT VISIT, EST, LEVL IV, 30-39 MIN: ICD-10-PCS | Mod: S$GLB,,, | Performed by: NURSE PRACTITIONER

## 2019-05-16 PROCEDURE — 76642 US BREAST BILATERAL LIMITED: ICD-10-PCS | Mod: 26,,, | Performed by: RADIOLOGY

## 2019-05-16 PROCEDURE — 99214 OFFICE O/P EST MOD 30 MIN: CPT | Mod: S$GLB,,, | Performed by: NURSE PRACTITIONER

## 2019-05-16 PROCEDURE — 77066 DX MAMMO INCL CAD BI: CPT | Mod: TC,PO

## 2019-05-16 PROCEDURE — 76642 ULTRASOUND BREAST LIMITED: CPT | Mod: TC,50,PO

## 2019-05-16 PROCEDURE — 99999 PR PBB SHADOW E&M-EST. PATIENT-LVL IV: CPT | Mod: PBBFAC,,, | Performed by: NURSE PRACTITIONER

## 2019-05-16 RX ORDER — SERTRALINE HYDROCHLORIDE 100 MG/1
100 TABLET, FILM COATED ORAL DAILY
Refills: 6 | COMMUNITY
Start: 2019-05-11 | End: 2021-03-22

## 2019-05-16 RX ORDER — ZOLPIDEM TARTRATE 10 MG/1
TABLET ORAL
Refills: 1 | COMMUNITY
Start: 2019-04-12 | End: 2023-03-22

## 2019-05-16 NOTE — LETTER
May 16, 2019    Allegra Reese  [address] [place] [address]  [place] [id number]         [facility]  [facility] [facility] [address]  Phone: [phone] May 16, 2019     Patient: Allegra Reese   YOB: 1970   Date of Visit: 5/16/2019       To Whom It May Concern:    It is my medical opinion that Allegra Reese be excused from work today as she had an appointment with us. She may return 5/17/19.     If you have any questions or concerns, please don't hesitate to call.    Sincerely,        Molly Mckenzie NP

## 2019-06-25 DIAGNOSIS — Z79.811 USE OF AROMATASE INHIBITORS: ICD-10-CM

## 2019-06-25 RX ORDER — ANASTROZOLE 1 MG/1
1 TABLET ORAL DAILY
Qty: 90 TABLET | Refills: 2 | Status: SHIPPED | OUTPATIENT
Start: 2019-06-25 | End: 2020-04-15 | Stop reason: SDUPTHER

## 2019-09-20 ENCOUNTER — OFFICE VISIT (OUTPATIENT)
Dept: HEMATOLOGY/ONCOLOGY | Facility: CLINIC | Age: 49
End: 2019-09-20
Payer: MEDICARE

## 2019-09-20 VITALS
SYSTOLIC BLOOD PRESSURE: 132 MMHG | HEIGHT: 69 IN | RESPIRATION RATE: 20 BRPM | OXYGEN SATURATION: 98 % | BODY MASS INDEX: 40.2 KG/M2 | DIASTOLIC BLOOD PRESSURE: 72 MMHG | WEIGHT: 271.38 LBS | HEART RATE: 99 BPM

## 2019-09-20 DIAGNOSIS — C50.612 CARCINOMA OF AXILLARY TAIL OF LEFT BREAST IN FEMALE, ESTROGEN RECEPTOR POSITIVE: Primary | ICD-10-CM

## 2019-09-20 DIAGNOSIS — Z17.0 CARCINOMA OF AXILLARY TAIL OF LEFT BREAST IN FEMALE, ESTROGEN RECEPTOR POSITIVE: Primary | ICD-10-CM

## 2019-09-20 DIAGNOSIS — Z79.811 USE OF AROMATASE INHIBITORS: ICD-10-CM

## 2019-09-20 PROCEDURE — 99213 OFFICE O/P EST LOW 20 MIN: CPT | Mod: PBBFAC | Performed by: INTERNAL MEDICINE

## 2019-09-20 PROCEDURE — 99999 PR PBB SHADOW E&M-EST. PATIENT-LVL III: CPT | Mod: PBBFAC,,, | Performed by: INTERNAL MEDICINE

## 2019-09-20 PROCEDURE — 99214 OFFICE O/P EST MOD 30 MIN: CPT | Mod: S$PBB,,, | Performed by: INTERNAL MEDICINE

## 2019-09-20 PROCEDURE — 99214 PR OFFICE/OUTPT VISIT, EST, LEVL IV, 30-39 MIN: ICD-10-PCS | Mod: S$PBB,,, | Performed by: INTERNAL MEDICINE

## 2019-09-20 PROCEDURE — 99999 PR PBB SHADOW E&M-EST. PATIENT-LVL III: ICD-10-PCS | Mod: PBBFAC,,, | Performed by: INTERNAL MEDICINE

## 2019-09-20 NOTE — PROGRESS NOTES
Subjective:       Patient ID: Allegra Reese is a 49 y.o. female.    Chief Complaint: No chief complaint on file.    HPI   Mrs. Reese returns today for follow up.   In late January 2017 she underwent a laparoscopic oophorectomies.  She remains on AIs.   Briefly, she is a 49-year-old  female from [place] who was diagnosed with breast cancer and on 04/27/2016 underwent bilateral mastectomies with reconstruction.   There was no invasive cancer or DCIS in the right breast; in the left breast she had three areas of an infiltrating ductal carcinoma measuring 11 mm, 5 mm, and 3 mm respectively.  One sentinel lymph node was positive and a full axillary dissection was performed with a total of five lymph nodes being positive.  She was tested and found to be negative for BRCA mutations.  Of note, her cancer was strongly ER and MN positive and HER 2 megative  She started adjuvant chemotherapy on June 24th 2016, and completed 4 cycles of AC and four cycles of paclitaxel.   She subsequently received XRT and was started on zoladex plus anastrazole.    Apparently she underwent a reconstruction on April 29, 2017, and two weeks alter she presented to the ED with SOB and was found to have PEs.  She was started on rivaroxaban, and is currently on 20 mg QHS.       Review of Systems    Overall today she feels OK.    Her  ECOG PS remains 1.  She denies any easy bruising, fevers, chills, night  sweats, weight loss, nausea, vomiting, diarrhea, constipation, diplopia, blurred vision, headaches, chest pain, palpitations, shortness of breath, breast pain, upper extremity pain, or difficulty ambulating.  The remainder of the ten-point ROS, including general, skin, lymph, H/N, cardiorespiratory, GI, , Neuro, Endocrine, and psychiatric is negative.       Objective:      Physical Exam    She is alert, oriented to time, place, person, pleasant, well      nourished, in no acute physical distress.                                     VITAL SIGNS:  Reviewed                                      HEENT: normal.  There are no nasal, oral, lip, gingival, auricular, lid,    or conjunctival lesions.  Mucosae are moist and pink, and there is no        thrush.  Pupils are equal, reactive to light and accommodation.              Extraocular muscle movements are intact.    Dentition is good.                                     NECK:  Supple without JVD, adenopathy, or thyromegaly.                       LUNGS:  Clear to auscultation without wheezing, rales, or rhonchi.           CARDIOVASCULAR:  Reveals an S1, S2, no murmurs, no rubs, no gallops.         ABDOMEN:  Soft, nontender, without organomegaly.  Bowel sounds are    present.   The abdominal incision has healed.                                                                 EXTREMITIES:  No cyanosis, clubbing, or edema.                               BREASTS:  She is status post bilateral mastectomies with free flap reconstructions.  The incisions have healed nicely.    There is a 2 cm hard nodule at the 9 o' clock position in the right reconstructed breast, representing fat necrosis.   This has been noted previously                        LYMPHATIC:  There is no cervical, axillary, or supraclavicular adenopathy.   SKIN:  Warm and moist, without petechiae, rashes, induration, or ecchymoses.  There is mild hyperpigmentation within the radiation field on the left chest wall.         NEUROLOGIC:  DTRs are 0-1+ bilaterally, symmetrical, motor function is 5/5,  and cranial nerves are  within normal limits.  Fundi are sharp without papilledema.     Assessment:       1. 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 Use of aromatase inhibitors     3.     PE, possibly related to the administration of AIs.     Plan:          I had a long discussion with her;  She will remain on anastrazole for now, and she will complete her 5 years in December 2021.  In regards to her  PE, I recommended that she remain on rivaroxaban for the whole time that she is on AIs.   RTC 4 months with a DXA scan.  Her questions were answered to her satisfaction.

## 2019-12-19 ENCOUNTER — OFFICE VISIT (OUTPATIENT)
Dept: URGENT CARE | Facility: CLINIC | Age: 49
End: 2019-12-19
Payer: MEDICARE

## 2019-12-19 VITALS
BODY MASS INDEX: 40.58 KG/M2 | SYSTOLIC BLOOD PRESSURE: 122 MMHG | DIASTOLIC BLOOD PRESSURE: 85 MMHG | HEART RATE: 102 BPM | WEIGHT: 274 LBS | HEIGHT: 69 IN | OXYGEN SATURATION: 100 % | RESPIRATION RATE: 20 BRPM | TEMPERATURE: 99 F

## 2019-12-19 DIAGNOSIS — Z20.828 EXPOSURE TO INFLUENZA: ICD-10-CM

## 2019-12-19 DIAGNOSIS — J11.1 INFLUENZA-LIKE ILLNESS: Primary | ICD-10-CM

## 2019-12-19 PROCEDURE — 99203 OFFICE O/P NEW LOW 30 MIN: CPT | Mod: S$GLB,,, | Performed by: NURSE PRACTITIONER

## 2019-12-19 PROCEDURE — 99203 PR OFFICE/OUTPT VISIT, NEW, LEVL III, 30-44 MIN: ICD-10-PCS | Mod: S$GLB,,, | Performed by: NURSE PRACTITIONER

## 2019-12-19 RX ORDER — GLIMEPIRIDE 4 MG/1
4 TABLET ORAL
COMMUNITY
End: 2021-03-22

## 2019-12-19 NOTE — PATIENT INSTRUCTIONS
Influenza (Adult)    Influenza is also called the flu. It is a viral illness that affects the air passages of your lungs. It is different from the common cold. The flu can easily be passed from one to person to another. It may be spread through the air by coughing and sneezing. Or it can be spread by touching the sick person and then touching your own eyes, nose, or mouth.  The flu starts 1 to 3 days after you are exposed to the flu virus. It may last for 1 to 2 weeks but many people feel tired or fatigued for many weeks afterward. You usually dont need to take antibiotics unless you have a complication. This might be an ear or sinus infection or pneumonia.  Symptoms of the flu may be mild or severe. They can include extreme tiredness (wanting to stay in bed all day), chills, fevers, muscle aches, soreness with eye movement, headache, and a dry, hacking cough.  Home care  Follow these guidelines when caring for yourself at home:  · Avoid being around cigarette smoke, whether yours or other peoples.  · Acetaminophen or ibuprofen will help ease your fever, muscle aches, and headache. Dont give aspirin to anyone younger than 18 who has the flu. Aspirin can harm the liver.  · Nausea and loss of appetite are common with the flu. Eat light meals. Drink 6 to 8 glasses of liquids every day. Good choices are water, sport drinks, soft drinks without caffeine, juices, tea, and soup. Extra fluids will also help loosen secretions in your nose and lungs.  · Over-the-counter cold medicines will not make the flu go away faster. But the medicines may help with coughing, sore throat, and congestion in your nose and sinuses. Dont use a decongestant if you have high blood pressure.  · Stay home until your fever has been gone for at least 24 hours without using medicine to reduce fever.  Follow-up care  Follow up with your healthcare provider, or as advised, if you are not getting better over the next week.  If you are age 65 or  older, talk with your provider about getting a pneumococcal vaccine every 5 years. You should also get this vaccine if you have chronic asthma or COPD. All adults should get a flu vaccine every fall. Ask your provider about this.  When to seek medical advice  Call your healthcare provider right away if any of these occur:  · Cough with lots of colored mucus (sputum) or blood in your mucus  · Chest pain, shortness of breath, wheezing, or trouble breathing  · Severe headache, or face, neck, or ear pain  · New rash with fever  · Fever of 100.4°F (38°C) or higher, or as directed by your healthcare provider  · Confusion, behavior change, or seizure  · Severe weakness or dizziness  · You get a new fever or cough after getting better for a few days  Date Last Reviewed: 1/1/2017  © 4828-4861 [employer]. [address] [place] [address]. All rights reserved. This information is not intended as a substitute for professional medical care. Always follow your healthcare professional's instructions.      Upper Respiratory Infections    The common cold/ viral upper respiratory infection is an acute, self-limiting infection of the upper respiratory tract characterized by variable degrees of sneezing, nasal congestion and discharge (rhinorrhea), sore throat, cough, low grade fever, headache, and malaise.    Symptoms usually peak on day 2 to 3 of illness and then gradually improve over 7 to 14 days.  A cough may linger for 3 to 4 weeks but should steadily improve over time.       The following information is provided to help you in treating upper respiratory infections.    Decongestant Nasal Sprays  Over-the-counter decongestant nasal spray such as Afrin, may be helpful as an initial step in treating upper respiratory infections. This spray can be used for up to approximately 3 days and is used no more than twice per day. Topical nasal decongestant spray for longer than 5 days will result in a physical  addiction, in which the nasal lining will become significantly swollen and irritated until the spray is used again.     Nasal Saline  Nasal saline is available over the counter. There are several different commercial preparations such as Ocean spray and Ayr spray. There is no limit on the use of Nasal saline. Saline is used by snorting the mist up into the nose then later gently blowing the nose to get rid of any secretions that it has loosened.    Nasal Steroids  Nasal steroid medications such as Flonase are useful for upper respiratory infections, allergies, and sensitivities to airborne irritants. Unfortunately, they do not begin to work for 1-2 days, and they do not reach their maximum benefit for approximately 2-3 weeks. Initial therapy is typically 2 puffs per nostril twice per day. This should be used for only a few days, then the maintenance dosage is one or two puffs per nostril once per day. This can be done at any time of the day. The most effective way to use any nasal medication is to look down at your toes when spraying it in. Aim slightly away from the septum (dividing plate between the nostrils), and gently inhale. This ensures that the spray will go into the sinus cavities and not straight up into the nose. A good way to avoid spraying onto the septum is to use the right hand to spray into the left nostril, and vice versa for the right nostril. Occasionally, nasal steroids can increase the risk of nosebleeds, but in general they are very well tolerated and effective medications.    Antihistamines  Antihistamines are available both over-the-counter and as a prescription. There are also various decongestant and antihistamine combinations available such as Claritin, Allegra, and Zyrtec. It is best to take any antihistamine-decongestant combination in the morning to avoid insomnia. Zyrtec should probably be taken at night, in order to reduce the chance of sleepiness during the daytime. If there is a  significant infection present and secretions are already thickened, it is recommended to discontinue antihistamines and use a mucous thinner/decongestant combination.    Mucous Thinners and Decongestants  Mucous thinners and decongestants are used to shrink down the tissues and promote sinus drainage. There are multiple prescription and over-the-counter varieties available. A mucous thinner will tend to be drying unless you are also drinking plenty of water when taking these. If you have high blood pressure, it is very important to monitor your pressure while on decongestants. The mucous thinner/decongestant combinations are typically given twice per day. However, some people will be unable to tolerate these at night and should only take them once per day.    Oral Steroids  Oral steroids can be used with more sever infections. Often, they are the only medications that will reduce the symptoms of pressure and allow the nasal sinuses to drain. These are best taken on a full stomach and earlier in the day is better. They may give you some irritability, stomach upset, or hyperactivity. This can also interfere with sleep. A person who has high blood pressure or diabetes needs to be very careful to monitor their pressure or blood glucose while taking steroids. Steroids can have multiple side effects when taken long-term, but short-term doses are very well tolerated and extremely effective in controlling the symptoms associated with acute and chronic sinus infections, severe allergies, or nasal polyps. The only significant side effect of note with oral steroids is the extraordinarily uncommon occurrence of damage to the hip cartilage, which is very rare and is usually associated with long-term usage of steroids. The use of steroids for greater than approximately seven days requires a tapering down in order to discontinue them. You should not abruptly stop your steroid if you have been taking the same dose for greater than  one week.    Antibiotic Treatment  Finally, when all of these other measures have failed, and a bacterial infection is present, an antibiotic will be prescribed. The most common symptoms of acute sinusitis of a bacterial nature are pain, pressure, and thick and colored nasal drainage. However, not all colored drainage means that there is a bacterial infection present. According to the Center for Disease Control, only 2% of colds will progress to result in bacterial sinusitis. Most upper respiratory infections should NOT be treated with antibiotics. Antibiotics should be reserved for upper respiratory infections which last longer than 10 days, or which worsen after 4 or 5 days of treatment. The use of antibiotics for nonbacterial upper respiratory infections has resulted in a severe problem with the emergence of bacteria which are resistant to multiple forms of antibiotics, and some bacteria are currently only treatable with intravenous antibiotics.    Body Aches/Pains/Fever  For patients who are not allergic to and are not on anticoagulants, you can alternate Tylenol and Motrin every 4-6 hours for fever above 100.4F and/or pain.  For patients who are allergic or intolerant to NSAIDS, have gastritis, gastric ulcers, or history of GI bleeds, are pregnant, or are on anticoagulant therapy, you can take Tylenol every 4 hours as needed for fever above 100.4F and/or pain.     Maintain adequate hydration -  Rest and keep yourself/patient well hydrated. For adults, it is recommended to drink at least 8-10 glasses of water daily.  This may help thin secretions and soothe the respiratory mucosa.     Drink Hot Liquids (coffee, water, tea, hot chocolate or soup). Put liquid in a Mug and place in Microwave for 2 to 3 minutes. CHANGE THE CUP THAT WAS USED IN THE MICROWAVE SO AS NOT TO BURN YOUR MOUTH.  Sniff the steam from the cup and sip the heated liquid TEN TO TWELVE TIMES A DAY for 5 to 7 Days.    COUGH  A viral cough may  linger for 3 to 4 weeks but should steadily improve over time.   Coughing is the body's natural way to clear mucus and help get rid of bacteria and viruses. Therefore, cough suppressants are usually not recomended.  Common cough suppressants include syrups with the ingredient dextromethorphan or DM, available over-the-counter, or prescription syrups containing codeine or hydrocone.  There is no proven benefit and have potential harms.       ?Honey may be beneficial, especially on nocturnal cough.   1 to 2 teaspoons can be taken straight or diluted in tea, juice or other liquid.    The antioxidants in honey are an important contributor to its decongestant properties. Generally speaking, darker honey contains more antioxidants. Buckwheat and avocado honey are particularly good choices. If these honeys are not available in your area, choose the darkest honey you can find.        1.  Take all medications as directed. If you have been prescribed antibiotics, make sure to complete them.   2.  Rest and keep yourself/patient well hydrated. For adults, it is recommended to drink at least 8-10 glasses of water daily.   3.  For patients above 6 months of age who are not allergic to and are not on anticoagulants, you can alternate Tylenol and Motrin every 4-6 hours for fever above 100.4F and/or pain.  For patients less than 6 months of age, allergic to or intolerant to NSAIDS, have gastritis, gastric ulcers, or history of GI bleeds, are pregnant, or are on anticoagulant therapy, you can take Tylenol every 4 hours as needed for fever above 100.4F and/or pain.   4. You should schedule a follow-up appointment with your Primary Care Provider/Pediatrician for recheck in 2-3 days or as directed at this visit.   5.  If your condition fails to improve in a timely manner, you should receive another evaluation by your Primary Care Provider/Pediatrician to discuss your concerns or return to urgent care for a recheck.  If your condition  worsens at any time, you should report immediately to your nearest Emergency Department for further evaluation. **You must understand that you have received Urgent Care treatment only and that you may be released before all of your medical problems are known or treated. You, the patient, are responsible to arrange for follow-up care as instructed.

## 2019-12-23 ENCOUNTER — OFFICE VISIT (OUTPATIENT)
Dept: URGENT CARE | Facility: CLINIC | Age: 49
End: 2019-12-23
Payer: MEDICARE

## 2019-12-23 VITALS
HEART RATE: 106 BPM | HEIGHT: 69 IN | WEIGHT: 274 LBS | OXYGEN SATURATION: 97 % | RESPIRATION RATE: 20 BRPM | DIASTOLIC BLOOD PRESSURE: 73 MMHG | TEMPERATURE: 99 F | SYSTOLIC BLOOD PRESSURE: 122 MMHG | BODY MASS INDEX: 40.58 KG/M2

## 2019-12-23 DIAGNOSIS — J02.9 SORE THROAT: ICD-10-CM

## 2019-12-23 DIAGNOSIS — J06.9 UPPER RESPIRATORY TRACT INFECTION, UNSPECIFIED TYPE: Primary | ICD-10-CM

## 2019-12-23 LAB
CTP QC/QA: YES
S PYO RRNA THROAT QL PROBE: NEGATIVE

## 2019-12-23 PROCEDURE — 99213 PR OFFICE/OUTPT VISIT, EST, LEVL III, 20-29 MIN: ICD-10-PCS | Mod: S$GLB,,, | Performed by: NURSE PRACTITIONER

## 2019-12-23 PROCEDURE — 87880 STREP A ASSAY W/OPTIC: CPT | Mod: QW,S$GLB,, | Performed by: NURSE PRACTITIONER

## 2019-12-23 PROCEDURE — 87880 POCT RAPID STREP A: ICD-10-PCS | Mod: QW,S$GLB,, | Performed by: NURSE PRACTITIONER

## 2019-12-23 PROCEDURE — 99213 OFFICE O/P EST LOW 20 MIN: CPT | Mod: S$GLB,,, | Performed by: NURSE PRACTITIONER

## 2019-12-23 RX ORDER — ALPRAZOLAM 0.5 MG/1
TABLET ORAL
COMMUNITY
End: 2023-03-22

## 2019-12-23 RX ORDER — PREDNISONE 20 MG/1
20 TABLET ORAL DAILY
Qty: 5 TABLET | Refills: 0 | Status: SHIPPED | OUTPATIENT
Start: 2019-12-23 | End: 2019-12-28

## 2019-12-23 RX ORDER — CYCLOBENZAPRINE HCL 10 MG
TABLET ORAL
COMMUNITY
Start: 2019-12-18 | End: 2023-03-22

## 2019-12-23 NOTE — LETTER
December 23, 2019      [facility]  [place]  [address]  [facility] [place] [address]  Phone: [phone]  Fax: [phone]       Patient: Allegra Reese   YOB: 1970  Date of Visit: 12/23/2019    To Whom It May Concern:    Minor Reese  was at [facility] on 12/23/2019. She may return to work/school on 12/26/19 with no restrictions. If you have any questions or concerns, or if I can be of further assistance, please do not hesitate to contact me.    Sincerely,    Amna Layne NP

## 2019-12-23 NOTE — PROGRESS NOTES
"Subjective:       Patient ID: Allegra Reese is a 49 y.o. female.    Vitals:  height is 5' 9" (1.753 m) and weight is 124.3 kg (274 lb). Her oral temperature is 98.8 °F (37.1 °C). Her blood pressure is 122/73 and her pulse is 106. Her respiration is 20 and oxygen saturation is 97%.     Chief Complaint: Sinus Problem    Sinus Problem   This is a new problem. Episode onset: 5 days ago. The problem has been gradually worsening since onset. There has been no fever. Her pain is at a severity of 0/10. She is experiencing no pain. Associated symptoms include congestion, coughing, ear pain, a hoarse voice, sinus pressure, sneezing and a sore throat. Pertinent negatives include no chills, diaphoresis, neck pain, shortness of breath or swollen glands. Treatments tried: Halls, Xofluza. The treatment provided no relief.       Constitution: Negative for chills, sweating, fatigue and fever.   HENT: Positive for ear pain, congestion, sinus pressure and sore throat. Negative for sinus pain and voice change.    Neck: Negative for neck pain and painful lymph nodes.   Eyes: Negative for eye redness.   Respiratory: Positive for cough. Negative for chest tightness, sputum production, bloody sputum, COPD, shortness of breath, stridor, wheezing and asthma.    Gastrointestinal: Negative for nausea and vomiting.   Musculoskeletal: Negative for muscle ache.   Skin: Negative for rash.   Allergic/Immunologic: Positive for sneezing. Negative for seasonal allergies and asthma.   Hematologic/Lymphatic: Negative for swollen lymph nodes.       Objective:      Physical Exam   Constitutional: She is oriented to person, place, and time. She appears well-developed and well-nourished. She is cooperative.  Non-toxic appearance. She appears ill. No distress.   HENT:   Head: Normocephalic and atraumatic.   Right Ear: Hearing, external ear and ear canal normal. A middle ear effusion is present.   Left Ear: Hearing, external ear and ear canal normal. " A middle ear effusion is present.   Nose: Mucosal edema and rhinorrhea present. No purulent discharge or nasal deformity. No epistaxis. Right sinus exhibits no maxillary sinus tenderness and no frontal sinus tenderness. Left sinus exhibits no maxillary sinus tenderness and no frontal sinus tenderness.   Mouth/Throat: Uvula is midline and mucous membranes are normal. No trismus in the jaw. Normal dentition. No uvula swelling. Posterior oropharyngeal edema and posterior oropharyngeal erythema present. No oropharyngeal exudate.   Eyes: Conjunctivae and lids are normal. No scleral icterus.   Neck: Trachea normal, full passive range of motion without pain and phonation normal. Neck supple. No neck rigidity. No edema and no erythema present.   Cardiovascular: Normal rate, regular rhythm, normal heart sounds, intact distal pulses and normal pulses.   Pulmonary/Chest: Effort normal and breath sounds normal. No respiratory distress. She has no decreased breath sounds. She has no rhonchi.   Abdominal: Normal appearance.   Musculoskeletal: Normal range of motion. She exhibits no edema or deformity.   Neurological: She is alert and oriented to person, place, and time. She exhibits normal muscle tone. Coordination normal.   Skin: Skin is warm, dry, intact, not diaphoretic and not pale.   Psychiatric: She has a normal mood and affect. Her speech is normal and behavior is normal. Judgment and thought content normal. Cognition and memory are normal.   Nursing note and vitals reviewed.        Assessment:       1. Upper respiratory tract infection, unspecified type    2. Sore throat        Plan:         Upper respiratory tract infection, unspecified type  -     predniSONE (DELTASONE) 20 MG tablet; Take 1 tablet (20 mg total) by mouth once daily. for 5 days  Dispense: 5 tablet; Refill: 0    Sore throat  -     POCT rapid strep A      Patient Instructions     Viral Upper Respiratory Illness (Adult)  You have a viral upper respiratory  illness (URI), which is another term for the common cold. This illness is contagious during the first few days. It is spread through the air by coughing and sneezing. It may also be spread by direct contact (touching the sick person and then touching your own eyes, nose, or mouth). Frequent handwashing will decrease risk of spread. Most viral illnesses go away within 7 to 10 days with rest and simple home remedies. Sometimes the illness may last for several weeks. Antibiotics will not kill a virus, and they are generally not prescribed for this condition.    Home care  · If symptoms are severe, rest at home for the first 2 to 3 days. When you resume activity, don't let yourself get too tired.  · Avoid being exposed to cigarette smoke (yours or others).  · You may use acetaminophen or ibuprofen to control pain and fever, unless another medicine was prescribed. (Note: If you have chronic liver or kidney disease, have ever had a stomach ulcer or gastrointestinal bleeding, or are taking blood-thinning medicines, talk with your healthcare provider before using these medicines.) Aspirin should never be given to anyone under 18 years of age who is ill with a viral infection or fever. It may cause severe liver or brain damage.  · Your appetite may be poor, so a light diet is fine. Avoid dehydration by drinking 6 to 8 glasses of fluids per day (water, soft drinks, juices, tea, or soup). Extra fluids will help loosen secretions in the nose and lungs.  · Over-the-counter cold medicines will not shorten the length of time youre sick, but they may be helpful for the following symptoms: cough, sore throat, and nasal and sinus congestion. (Note: Do not use decongestants if you have high blood pressure.)  Follow-up care  Follow up with your healthcare provider, or as advised.  When to seek medical advice  Call your healthcare provider right away if any of these occur:  · Cough with lots of colored sputum (mucus)  · Severe  headache; face, neck, or ear pain  · Difficulty swallowing due to throat pain  · Fever of 100.4°F (38°C)  Call 911, or get immediate medical care  Call emergency services right away if any of these occur:  · Chest pain, shortness of breath, wheezing, or difficulty breathing  · Coughing up blood  · Inability to swallow due to throat pain  Date Last Reviewed: 9/13/2015  © 6370-2361 [employer], [place]. [address] [place] [address], [place] [address]. All rights reserved. This information is not intended as a substitute for professional medical care. Always follow your healthcare professional's instructions.      Upper Respiratory Infections    The common cold/ viral upper respiratory infection is an acute, self-limiting infection of the upper respiratory tract characterized by variable degrees of sneezing, nasal congestion and discharge (rhinorrhea), sore throat, cough, low grade fever, headache, and malaise.    Symptoms usually peak on day 2 to 3 of illness and then gradually improve over 7 to 14 days.  A cough may linger for 3 to 4 weeks but should steadily improve over time.       The following information is provided to help you in treating upper respiratory infections.    Decongestant Nasal Sprays  Over-the-counter decongestant nasal spray such as Afrin, may be helpful as an initial step in treating upper respiratory infections. This spray can be used for up to approximately 3 days and is used no more than twice per day. Topical nasal decongestant spray for longer than 5 days will result in a physical addiction, in which the nasal lining will become significantly swollen and irritated until the spray is used again.     Nasal Saline  Nasal saline is available over the counter. There are several different commercial preparations such as Ocean spray and Ayr spray. There is no limit on the use of Nasal saline. Saline is used by snorting the mist up into the nose then later gently blowing the nose to get rid of any  secretions that it has loosened.    Nasal Steroids  Nasal steroid medications such as Flonase are useful for upper respiratory infections, allergies, and sensitivities to airborne irritants. Unfortunately, they do not begin to work for 1-2 days, and they do not reach their maximum benefit for approximately 2-3 weeks. Initial therapy is typically 2 puffs per nostril twice per day. This should be used for only a few days, then the maintenance dosage is one or two puffs per nostril once per day. This can be done at any time of the day. The most effective way to use any nasal medication is to look down at your toes when spraying it in. Aim slightly away from the septum (dividing plate between the nostrils), and gently inhale. This ensures that the spray will go into the sinus cavities and not straight up into the nose. A good way to avoid spraying onto the septum is to use the right hand to spray into the left nostril, and vice versa for the right nostril. Occasionally, nasal steroids can increase the risk of nosebleeds, but in general they are very well tolerated and effective medications.    Antihistamines  Antihistamines are available both over-the-counter and as a prescription. There are also various decongestant and antihistamine combinations available such as Claritin, Allegra, and Zyrtec. It is best to take any antihistamine-decongestant combination in the morning to avoid insomnia. Zyrtec should probably be taken at night, in order to reduce the chance of sleepiness during the daytime. If there is a significant infection present and secretions are already thickened, it is recommended to discontinue antihistamines and use a mucous thinner/decongestant combination.    Mucous Thinners and Decongestants  Mucous thinners and decongestants are used to shrink down the tissues and promote sinus drainage. There are multiple prescription and over-the-counter varieties available. A mucous thinner will tend to be drying  unless you are also drinking plenty of water when taking these. If you have high blood pressure, it is very important to monitor your pressure while on decongestants. The mucous thinner/decongestant combinations are typically given twice per day. However, some people will be unable to tolerate these at night and should only take them once per day.    Oral Steroids  Oral steroids can be used with more sever infections. Often, they are the only medications that will reduce the symptoms of pressure and allow the nasal sinuses to drain. These are best taken on a full stomach and earlier in the day is better. They may give you some irritability, stomach upset, or hyperactivity. This can also interfere with sleep. A person who has high blood pressure or diabetes needs to be very careful to monitor their pressure or blood glucose while taking steroids. Steroids can have multiple side effects when taken long-term, but short-term doses are very well tolerated and extremely effective in controlling the symptoms associated with acute and chronic sinus infections, severe allergies, or nasal polyps. The only significant side effect of note with oral steroids is the extraordinarily uncommon occurrence of damage to the hip cartilage, which is very rare and is usually associated with long-term usage of steroids. The use of steroids for greater than approximately seven days requires a tapering down in order to discontinue them. You should not abruptly stop your steroid if you have been taking the same dose for greater than one week.    Antibiotic Treatment  Finally, when all of these other measures have failed, and a bacterial infection is present, an antibiotic will be prescribed. The most common symptoms of acute sinusitis of a bacterial nature are pain, pressure, and thick and colored nasal drainage. However, not all colored drainage means that there is a bacterial infection present. According to the Center for Disease Control,  only 2% of colds will progress to result in bacterial sinusitis. Most upper respiratory infections should NOT be treated with antibiotics. Antibiotics should be reserved for upper respiratory infections which last longer than 10 days, or which worsen after 4 or 5 days of treatment. The use of antibiotics for nonbacterial upper respiratory infections has resulted in a severe problem with the emergence of bacteria which are resistant to multiple forms of antibiotics, and some bacteria are currently only treatable with intravenous antibiotics.    Body Aches/Pains/Fever  For patients who are not allergic to and are not on anticoagulants, you can alternate Tylenol and Motrin every 4-6 hours for fever above 100.4F and/or pain.  For patients who are allergic or intolerant to NSAIDS, have gastritis, gastric ulcers, or history of GI bleeds, are pregnant, or are on anticoagulant therapy, you can take Tylenol every 4 hours as needed for fever above 100.4F and/or pain.     Maintain adequate hydration -  Rest and keep yourself/patient well hydrated. For adults, it is recommended to drink at least 8-10 glasses of water daily.  This may help thin secretions and soothe the respiratory mucosa.     Drink Hot Liquids (coffee, water, tea, hot chocolate or soup). Put liquid in a Mug and place in Microwave for 2 to 3 minutes. CHANGE THE CUP THAT WAS USED IN THE MICROWAVE SO AS NOT TO BURN YOUR MOUTH.  Sniff the steam from the cup and sip the heated liquid TEN TO TWELVE TIMES A DAY for 5 to 7 Days.    COUGH  A viral cough may linger for 3 to 4 weeks but should steadily improve over time.   Coughing is the body's natural way to clear mucus and help get rid of bacteria and viruses. Therefore, cough suppressants are usually not recomended.  Common cough suppressants include syrups with the ingredient dextromethorphan or DM, available over-the-counter, or prescription syrups containing codeine or hydrocone.  There is no proven benefit and  have potential harms.       ?Honey may be beneficial, especially on nocturnal cough.   1 to 2 teaspoons can be taken straight or diluted in tea, juice or other liquid.    The antioxidants in honey are an important contributor to its decongestant properties. Generally speaking, darker honey contains more antioxidants. Buckwheat and avocado honey are particularly good choices. If these honeys are not available in your area, choose the darkest honey you can find.        1.  Take all medications as directed. If you have been prescribed antibiotics, make sure to complete them.   2.  Rest and keep yourself/patient well hydrated. For adults, it is recommended to drink at least 8-10 glasses of water daily.   3.  For patients above 6 months of age who are not allergic to and are not on anticoagulants, you can alternate Tylenol and Motrin every 4-6 hours for fever above 100.4F and/or pain.  For patients less than 6 months of age, allergic to or intolerant to NSAIDS, have gastritis, gastric ulcers, or history of GI bleeds, are pregnant, or are on anticoagulant therapy, you can take Tylenol every 4 hours as needed for fever above 100.4F and/or pain.   4. You should schedule a follow-up appointment with your Primary Care Provider/Pediatrician for recheck in 2-3 days or as directed at this visit.   5.  If your condition fails to improve in a timely manner, you should receive another evaluation by your Primary Care Provider/Pediatrician to discuss your concerns or return to urgent care for a recheck.  If your condition worsens at any time, you should report immediately to your nearest Emergency Department for further evaluation. **You must understand that you have received Urgent Care treatment only and that you may be released before all of your medical problems are known or treated. You, the patient, are responsible to arrange for follow-up care as instructed.

## 2019-12-23 NOTE — PATIENT INSTRUCTIONS
Viral Upper Respiratory Illness (Adult)  You have a viral upper respiratory illness (URI), which is another term for the common cold. This illness is contagious during the first few days. It is spread through the air by coughing and sneezing. It may also be spread by direct contact (touching the sick person and then touching your own eyes, nose, or mouth). Frequent handwashing will decrease risk of spread. Most viral illnesses go away within 7 to 10 days with rest and simple home remedies. Sometimes the illness may last for several weeks. Antibiotics will not kill a virus, and they are generally not prescribed for this condition.    Home care  · If symptoms are severe, rest at home for the first 2 to 3 days. When you resume activity, don't let yourself get too tired.  · Avoid being exposed to cigarette smoke (yours or others).  · You may use acetaminophen or ibuprofen to control pain and fever, unless another medicine was prescribed. (Note: If you have chronic liver or kidney disease, have ever had a stomach ulcer or gastrointestinal bleeding, or are taking blood-thinning medicines, talk with your healthcare provider before using these medicines.) Aspirin should never be given to anyone under 18 years of age who is ill with a viral infection or fever. It may cause severe liver or brain damage.  · Your appetite may be poor, so a light diet is fine. Avoid dehydration by drinking 6 to 8 glasses of fluids per day (water, soft drinks, juices, tea, or soup). Extra fluids will help loosen secretions in the nose and lungs.  · Over-the-counter cold medicines will not shorten the length of time youre sick, but they may be helpful for the following symptoms: cough, sore throat, and nasal and sinus congestion. (Note: Do not use decongestants if you have high blood pressure.)  Follow-up care  Follow up with your healthcare provider, or as advised.  When to seek medical advice  Call your healthcare provider right away if any  of these occur:  · Cough with lots of colored sputum (mucus)  · Severe headache; face, neck, or ear pain  · Difficulty swallowing due to throat pain  · Fever of 100.4°F (38°C)  Call 911, or get immediate medical care  Call emergency services right away if any of these occur:  · Chest pain, shortness of breath, wheezing, or difficulty breathing  · Coughing up blood  · Inability to swallow due to throat pain  Date Last Reviewed: 9/13/2015  © 0426-7214 [employer]. [address] [place] [id number]. All rights reserved. This information is not intended as a substitute for professional medical care. Always follow your healthcare professional's instructions.      Upper Respiratory Infections    The common cold/ viral upper respiratory infection is an acute, self-limiting infection of the upper respiratory tract characterized by variable degrees of sneezing, nasal congestion and discharge (rhinorrhea), sore throat, cough, low grade fever, headache, and malaise.    Symptoms usually peak on day 2 to 3 of illness and then gradually improve over 7 to 14 days.  A cough may linger for 3 to 4 weeks but should steadily improve over time.       The following information is provided to help you in treating upper respiratory infections.    Decongestant Nasal Sprays  Over-the-counter decongestant nasal spray such as Afrin, may be helpful as an initial step in treating upper respiratory infections. This spray can be used for up to approximately 3 days and is used no more than twice per day. Topical nasal decongestant spray for longer than 5 days will result in a physical addiction, in which the nasal lining will become significantly swollen and irritated until the spray is used again.     Nasal Saline  Nasal saline is available over the counter. There are several different commercial preparations such as Ocean spray and Ayr spray. There is no limit on the use of Nasal saline. Saline is used by snorting the mist  up into the nose then later gently blowing the nose to get rid of any secretions that it has loosened.    Nasal Steroids  Nasal steroid medications such as Flonase are useful for upper respiratory infections, allergies, and sensitivities to airborne irritants. Unfortunately, they do not begin to work for 1-2 days, and they do not reach their maximum benefit for approximately 2-3 weeks. Initial therapy is typically 2 puffs per nostril twice per day. This should be used for only a few days, then the maintenance dosage is one or two puffs per nostril once per day. This can be done at any time of the day. The most effective way to use any nasal medication is to look down at your toes when spraying it in. Aim slightly away from the septum (dividing plate between the nostrils), and gently inhale. This ensures that the spray will go into the sinus cavities and not straight up into the nose. A good way to avoid spraying onto the septum is to use the right hand to spray into the left nostril, and vice versa for the right nostril. Occasionally, nasal steroids can increase the risk of nosebleeds, but in general they are very well tolerated and effective medications.    Antihistamines  Antihistamines are available both over-the-counter and as a prescription. There are also various decongestant and antihistamine combinations available such as Claritin, Allegra, and Zyrtec. It is best to take any antihistamine-decongestant combination in the morning to avoid insomnia. Zyrtec should probably be taken at night, in order to reduce the chance of sleepiness during the daytime. If there is a significant infection present and secretions are already thickened, it is recommended to discontinue antihistamines and use a mucous thinner/decongestant combination.    Mucous Thinners and Decongestants  Mucous thinners and decongestants are used to shrink down the tissues and promote sinus drainage. There are multiple prescription and  over-the-counter varieties available. A mucous thinner will tend to be drying unless you are also drinking plenty of water when taking these. If you have high blood pressure, it is very important to monitor your pressure while on decongestants. The mucous thinner/decongestant combinations are typically given twice per day. However, some people will be unable to tolerate these at night and should only take them once per day.    Oral Steroids  Oral steroids can be used with more sever infections. Often, they are the only medications that will reduce the symptoms of pressure and allow the nasal sinuses to drain. These are best taken on a full stomach and earlier in the day is better. They may give you some irritability, stomach upset, or hyperactivity. This can also interfere with sleep. A person who has high blood pressure or diabetes needs to be very careful to monitor their pressure or blood glucose while taking steroids. Steroids can have multiple side effects when taken long-term, but short-term doses are very well tolerated and extremely effective in controlling the symptoms associated with acute and chronic sinus infections, severe allergies, or nasal polyps. The only significant side effect of note with oral steroids is the extraordinarily uncommon occurrence of damage to the hip cartilage, which is very rare and is usually associated with long-term usage of steroids. The use of steroids for greater than approximately seven days requires a tapering down in order to discontinue them. You should not abruptly stop your steroid if you have been taking the same dose for greater than one week.    Antibiotic Treatment  Finally, when all of these other measures have failed, and a bacterial infection is present, an antibiotic will be prescribed. The most common symptoms of acute sinusitis of a bacterial nature are pain, pressure, and thick and colored nasal drainage. However, not all colored drainage means that there is  a bacterial infection present. According to the Center for Disease Control, only 2% of colds will progress to result in bacterial sinusitis. Most upper respiratory infections should NOT be treated with antibiotics. Antibiotics should be reserved for upper respiratory infections which last longer than 10 days, or which worsen after 4 or 5 days of treatment. The use of antibiotics for nonbacterial upper respiratory infections has resulted in a severe problem with the emergence of bacteria which are resistant to multiple forms of antibiotics, and some bacteria are currently only treatable with intravenous antibiotics.    Body Aches/Pains/Fever  For patients who are not allergic to and are not on anticoagulants, you can alternate Tylenol and Motrin every 4-6 hours for fever above 100.4F and/or pain.  For patients who are allergic or intolerant to NSAIDS, have gastritis, gastric ulcers, or history of GI bleeds, are pregnant, or are on anticoagulant therapy, you can take Tylenol every 4 hours as needed for fever above 100.4F and/or pain.     Maintain adequate hydration -  Rest and keep yourself/patient well hydrated. For adults, it is recommended to drink at least 8-10 glasses of water daily.  This may help thin secretions and soothe the respiratory mucosa.     Drink Hot Liquids (coffee, water, tea, hot chocolate or soup). Put liquid in a Mug and place in Microwave for 2 to 3 minutes. CHANGE THE CUP THAT WAS USED IN THE MICROWAVE SO AS NOT TO BURN YOUR MOUTH.  Sniff the steam from the cup and sip the heated liquid TEN TO TWELVE TIMES A DAY for 5 to 7 Days.    COUGH  A viral cough may linger for 3 to 4 weeks but should steadily improve over time.   Coughing is the body's natural way to clear mucus and help get rid of bacteria and viruses. Therefore, cough suppressants are usually not recomended.  Common cough suppressants include syrups with the ingredient dextromethorphan or DM, available over-the-counter, or prescription  syrups containing codeine or hydrocone.  There is no proven benefit and have potential harms.       ?Honey may be beneficial, especially on nocturnal cough.   1 to 2 teaspoons can be taken straight or diluted in tea, juice or other liquid.    The antioxidants in honey are an important contributor to its decongestant properties. Generally speaking, darker honey contains more antioxidants. Buckwheat and avocado honey are particularly good choices. If these honeys are not available in your area, choose the darkest honey you can find.        1.  Take all medications as directed. If you have been prescribed antibiotics, make sure to complete them.   2.  Rest and keep yourself/patient well hydrated. For adults, it is recommended to drink at least 8-10 glasses of water daily.   3.  For patients above 6 months of age who are not allergic to and are not on anticoagulants, you can alternate Tylenol and Motrin every 4-6 hours for fever above 100.4F and/or pain.  For patients less than 6 months of age, allergic to or intolerant to NSAIDS, have gastritis, gastric ulcers, or history of GI bleeds, are pregnant, or are on anticoagulant therapy, you can take Tylenol every 4 hours as needed for fever above 100.4F and/or pain.   4. You should schedule a follow-up appointment with your Primary Care Provider/Pediatrician for recheck in 2-3 days or as directed at this visit.   5.  If your condition fails to improve in a timely manner, you should receive another evaluation by your Primary Care Provider/Pediatrician to discuss your concerns or return to urgent care for a recheck.  If your condition worsens at any time, you should report immediately to your nearest Emergency Department for further evaluation. **You must understand that you have received Urgent Care treatment only and that you may be released before all of your medical problems are known or treated. You, the patient, are responsible to arrange for follow-up care as  instructed.

## 2020-01-03 ENCOUNTER — OFFICE VISIT (OUTPATIENT)
Dept: URGENT CARE | Facility: CLINIC | Age: 50
End: 2020-01-03
Payer: MEDICARE

## 2020-01-03 VITALS
DIASTOLIC BLOOD PRESSURE: 91 MMHG | HEIGHT: 69 IN | WEIGHT: 274 LBS | HEART RATE: 104 BPM | TEMPERATURE: 98 F | BODY MASS INDEX: 40.58 KG/M2 | SYSTOLIC BLOOD PRESSURE: 135 MMHG | OXYGEN SATURATION: 99 %

## 2020-01-03 DIAGNOSIS — B96.89 ACUTE BACTERIAL SINUSITIS: Primary | ICD-10-CM

## 2020-01-03 DIAGNOSIS — I10 ESSENTIAL HYPERTENSION: ICD-10-CM

## 2020-01-03 DIAGNOSIS — B02.9 HERPES ZOSTER WITHOUT COMPLICATION: ICD-10-CM

## 2020-01-03 DIAGNOSIS — J01.90 ACUTE BACTERIAL SINUSITIS: Primary | ICD-10-CM

## 2020-01-03 DIAGNOSIS — R21 RASH AND NONSPECIFIC SKIN ERUPTION: ICD-10-CM

## 2020-01-03 PROCEDURE — 99214 OFFICE O/P EST MOD 30 MIN: CPT | Mod: S$GLB,,, | Performed by: NURSE PRACTITIONER

## 2020-01-03 PROCEDURE — 99214 PR OFFICE/OUTPT VISIT, EST, LEVL IV, 30-39 MIN: ICD-10-PCS | Mod: S$GLB,,, | Performed by: NURSE PRACTITIONER

## 2020-01-03 RX ORDER — MUPIROCIN 20 MG/G
OINTMENT TOPICAL
Qty: 22 G | Refills: 1 | Status: SHIPPED | OUTPATIENT
Start: 2020-01-03 | End: 2023-03-22

## 2020-01-03 RX ORDER — NYSTATIN 100000 U/G
CREAM TOPICAL 2 TIMES DAILY
Qty: 30 G | Refills: 0 | Status: SHIPPED | OUTPATIENT
Start: 2020-01-03 | End: 2021-03-22

## 2020-01-03 RX ORDER — AMOXICILLIN 875 MG/1
875 TABLET, FILM COATED ORAL EVERY 12 HOURS
Qty: 20 TABLET | Refills: 0 | Status: SHIPPED | OUTPATIENT
Start: 2020-01-03 | End: 2020-01-13

## 2020-01-03 NOTE — PROGRESS NOTES
"Subjective:       Patient ID: Allegra Reese is a 49 y.o. female.    Vitals:  height is 5' 9" (1.753 m) and weight is 124.3 kg (274 lb). Her oral temperature is 97.5 °F (36.4 °C). Her blood pressure is 135/91 (abnormal) and her pulse is 104. Her oxygen saturation is 99%.     Chief Complaint: Sinus Problem and Rash    NIDDM, denies hyperglycemia symptoms, does not routinely check blood sugars at home. Started with uri s/s 12-14 days ago, treated for flu first, never fully resolved then placed on prednisone for 5 days still without any resolution. Now with thick green nasal congestion. Pt has had shingles once previously several years ago, right abdominal wall.    Sinus Problem   This is a new problem. Episode onset: 10 days ago. The problem is unchanged. There has been no fever. Associated symptoms include congestion, coughing, ear pain (itchy), sinus pressure, sneezing and a sore throat. Pertinent negatives include no chills, diaphoresis, headaches, hoarse voice, neck pain, shortness of breath or swollen glands. (Ear itching both ears  ) Treatments tried: prednisone, throat spray. The treatment provided mild relief.   Rash   This is a new problem. Episode onset: not sure when it started, at least 5-6 days ago. The problem has been gradually worsening since onset. The affected locations include the right buttock. The rash is characterized by itchiness and redness. It is unknown if there was an exposure to a precipitant. Associated symptoms include congestion, coughing and a sore throat. Pertinent negatives include no diarrhea, eye pain, facial edema, fatigue, fever, joint pain, shortness of breath or vomiting. Treatments tried: hydrocortisone. The treatment provided no relief.       Constitution: Negative for chills, sweating, fatigue and fever.   HENT: Positive for ear pain (itchy), congestion, postnasal drip, sinus pressure and sore throat. Negative for ear discharge, trouble swallowing and voice change.  "   Neck: Negative for neck pain, neck stiffness and painful lymph nodes.   Cardiovascular: Negative for chest pain, leg swelling and sob on exertion.   Eyes: Negative for eye discharge, eye itching, eye pain, double vision and blurred vision.   Respiratory: Positive for cough. Negative for shortness of breath.    Gastrointestinal: Negative for abdominal pain, nausea, vomiting and diarrhea.   Endocrine: excessive thirst and excessive urination.   Genitourinary: Negative for dysuria, frequency and urgency.   Musculoskeletal: Negative for joint pain, joint swelling, muscle cramps and muscle ache.   Skin: Positive for rash and erythema. Negative for color change and pale.   Allergic/Immunologic: Positive for sneezing. Negative for seasonal allergies.   Neurological: Negative for dizziness, light-headedness and headaches.   Hematologic/Lymphatic: Negative for swollen lymph nodes and easy bruising/bleeding. Does not bruise/bleed easily.       Objective:      Physical Exam   Constitutional: She is oriented to person, place, and time. She appears well-developed and well-nourished. She is cooperative.  Non-toxic appearance. She does not have a sickly appearance. She does not appear ill. No distress.   HENT:   Head: Normocephalic and atraumatic.   Right Ear: Hearing, external ear and ear canal normal. No mastoid tenderness. Tympanic membrane is not erythematous. A middle ear effusion is present.   Left Ear: Hearing, external ear and ear canal normal. No mastoid tenderness. Tympanic membrane is not erythematous. A middle ear effusion is present.   Nose: Mucosal edema and purulent discharge present. No rhinorrhea or nasal deformity. No epistaxis. Right sinus exhibits maxillary sinus tenderness. Right sinus exhibits no frontal sinus tenderness. Left sinus exhibits maxillary sinus tenderness. Left sinus exhibits no frontal sinus tenderness.   Mouth/Throat: Uvula is midline and mucous membranes are normal. Mucous membranes are not  pale. No trismus in the jaw. Normal dentition. No uvula swelling. Posterior oropharyngeal erythema present. No oropharyngeal exudate, posterior oropharyngeal edema, tonsillar abscesses or cobblestoning. Tonsils are 0 on the right. Tonsils are 0 on the left. No tonsillar exudate.   Eyes: Conjunctivae and lids are normal. Right eye exhibits no discharge. Left eye exhibits no discharge. No scleral icterus.   Neck: Trachea normal, normal range of motion, full passive range of motion without pain and phonation normal. Neck supple. No neck rigidity. No tracheal deviation, no edema and no erythema present.   Cardiovascular: Normal rate, regular rhythm, normal heart sounds, intact distal pulses and normal pulses. PMI is not displaced.   No murmur heard.  Pulmonary/Chest: Effort normal and breath sounds normal. No respiratory distress. She has no decreased breath sounds. She has no wheezes. She has no rhonchi. She exhibits no tenderness.   Abdominal: Soft. Normal appearance and bowel sounds are normal. She exhibits no distension. There is no tenderness.   Musculoskeletal: Normal range of motion. She exhibits no edema or deformity.   Lymphadenopathy:     She has cervical adenopathy.   Neurological: She is alert and oriented to person, place, and time. She exhibits normal muscle tone. Coordination normal.   Skin: Skin is warm, dry, intact, not diaphoretic, not pale and rash.   Pt with vessicular rash to right buttocks linear pattern, 1cm x 2.5cm  Lesions:  erythema  Psychiatric: She has a normal mood and affect. Her speech is normal and behavior is normal. Judgment and thought content normal. Cognition and memory are normal.   Nursing note and vitals reviewed.        Assessment:       1. Acute bacterial sinusitis    2. Herpes zoster without complication    3. Essential hypertension    4. Rash and nonspecific skin eruption        Plan:     Pt with rash to right buttocks, it appears to be shingles but not painful. Pt has had  shingles previously. She is out of time for antiviral therapy. Pt additionally with sinusitis. Lungs cta with e/u respirations, no evidence respiratory distress. BP elevation, has htn, no associated symptoms, no evidence end organ damage. Advised on s/s to seek emergency care, close follow up. Verbalizes understanding and agreement with treatment plan.    Acute bacterial sinusitis  -     amoxicillin (AMOXIL) 875 MG tablet; Take 1 tablet (875 mg total) by mouth every 12 (twelve) hours. for 10 days  Dispense: 20 tablet; Refill: 0    Herpes zoster without complication    Essential hypertension    Rash and nonspecific skin eruption  -     nystatin (MYCOSTATIN) cream; Apply topically 2 (two) times daily. for 10 days  Dispense: 30 g; Refill: 0  -     mupirocin (BACTROBAN) 2 % ointment; Apply to affected area 3 times daily  Dispense: 22 g; Refill: 1         Patient Instructions       Elevated Blood Pressure  Your blood pressure was elevated during your visit to the urgent care today.  It was not so high that immediate care was needed, but it is recommended that you monitor your blood pressure over the next week or two to make sure that it is not staying elevated.  If you are on blood pressure medication currently, continue as already prescribed. Please have your blood pressure taken 2-3 times daily at different times of the day.  Keep a log of these blood pressure readings and take it with you to see your Primary Care Physician.  Bring today's discharge papers as well to your follow up appointment. If your blood pressure is consistently above 140/90, you should follow-up with your PCP without delay. If you develop chest pain, shortness of breath, dizziness, vision changes, or any other concerning symptoms, you should seek immediate care in the Emergency Department.       Watch your blood sugar during times of illness more frequently, 2-3 times daily, for the next several days. Seek emergency care any blood sugars over 300  or any concerning symptoms.    Shingles (Herpes Zoster)     Talk to your healthcare provider about the shingles vaccine.     Shingles is also called herpes zoster. It is a painful skin rash caused by the herpes zoster virus. This is the same virus that causes chickenpox. After a person has chickenpox, the virus remains inactive in the nerve cells. Years later, the virus can become active again and travel to the skin. Most people have shingles only once, but it is possible to have it more than once.  What are the risk factors for shingles?  Anyone who has ever had chickenpox can develop shingles. But your risk is greater if you:  Are 50 years of age or older  Have an illness that weakens your immune system, such as HIV/AIDS  Have cancer, especially Hodgkin disease or lymphoma  Take medicines that weaken your immune system  What are the symptoms of shingles?  The first sign of shingles is usually pain, burning, tingling, or itching on one part of your face or body. You may also feel as if you have the flu, with fever and chills.  A red rash with small blisters appears within a few days. The rash may appear as follows:   The blisters can occur anywhere, but theyre most common on the back, chest, or abdomen.  They usually appear on only one side of the body, spreading along the nerve pathway where the virus was inactive.   The rash can also form around an eye, along one side of the face or neck, or in the mouth.  In a few people, usually those with weakened immune systems, shingles appear on more than one part of the body at once.  After a few days, the blisters become dry and form a crust. The crust falls off in days to weeks. The blisters generally do not leave scars.  How is shingles treated?  For most people, shingles heals on its own in a few weeks. But treatment is recommended to help relieve pain, speed healing, and reduce the risk of complications. Antiviral medicines are prescribed within the first 72 hours of  the appearance of the rash. To lessen symptoms:  Apply ice packs (wrapped in a thin towel) or cool compresses, or soak in a cool bath.  Use calamine lotion to calm itchy skin.  Ask your healthcare provider about over-the-counter pain relievers. If your pain is severe, your healthcare provider may prescribe stronger pain medicines.  What are the complications of shingles?  Shingles often goes away with no lasting effects. But some people have serious problems long after the blisters have healed:  Postherpetic neuralgia. This is the most common complication. It is severe nerve pain at the place where the rash used to be. It can last for months, or even years after you have had shingles. Medicines can be prescribed to help relieve the pain and improve quality of life.  Bacterial infection. Shingles blisters may become infected with bacteria. Antibiotic medicine is used to treat the infection.  Eye problems. A person with shingles on the face should see his or her healthcare provider right away. Shingles can cause serious problems with vision, and even blindness.  Very rarely shingles can also lead to pneumonia, hearing problems, brain inflammation, or even death.   When to seek medical care  Contact your healthcare provider if you experience any of the following:  Symptoms that dont go away with treatment  A rash or blisters near your eye  Increased drainage, fever, or rash after treatment, or severe pain that doesnt go away   How can shingles be prevented?  You can only get shingles if you have had chicken pox in the past. Those who have never had chickenpox can get the virus from you. Although instead of developing shingles, the person may get chickenpox. Until your blisters form scabs, avoid contact with others, especially the following:  Pregnant women who have never had chickenpox or the vaccine  Infants who were born early (prematurely) or who had low weight at birth  People with weak immune system (for  example, people receiving chemotherapy for cancer, people who have had organ transplants, or people with HIV infections)     The shingles vaccine  If youre 60 years of age or older, ask your healthcare provider if you should receive the shingles vaccine. The vaccine makes it less likely that you will develop shingles. If you do develop shingles, your symptoms will likely be milder than if you hadnt been vaccinated. Note: Although the vaccine is licensed for people 50 years of age or older, the CDC does not recommend the vaccine for those who are 50 to 59 years old.   Date Last Reviewed: 10/1/2016  © 1705-6611 [employer]. [address] [place] [address], [place] [address]. All rights reserved. This information is not intended as a substitute for professional medical care. Always follow your healthcare professional's instructions.          1.  Take all antibiotics as prescribed.  It is imperative that once you start them, you take them to completion unless otherwise directed.  You should not have left over antibiotics.     2.  For patients above 6 months of age who are not allergic to and are not on anticoagulants, you can alternate Tylenol and Motrin every 4-6 hours for fever above 100.4F and/or pain.  For patients less than 6 months of age, allergic to or intolerant to NSAIDS, have gastritis, gastric ulcers, or history of GI bleeds, are pregnant, or are on anticoagulant therapy, you can take Tylenol every 4 hours as needed for fever above 100.4F and/or pain.     3.  Rest and keep yourself well hydrated.  Drink hot liquids (coffee, water, tea, hot chocolate, or soup) 10-12 times a day for 5-7 days.  Put liquid in a mug and place in microwave for 2.5 - 3 minutes. Pour hot liquid into another mug not used to microwave the liquid (to avoid burning your mouth) then sniff the steam from the cup and sip the heated liquid.    4.  You can use these over the counter medications/remedies to help with your symptoms:      Runny Nose:  Use an antihistamine such as Claritin, Zyrtec or Allegra to help dry you out.     Congestion:  Use pseudoephedrine (behind the counter) for congestion- Pseudoephedrine 30 mg up to 240 mg /day. Warning:  It can raise your blood pressure and give you palpitations.  Coricidin HBP is okay to use if you have high blood pressure.     Use mucinex (guaifenisin) up to 2400mg/day to break up/loosen any mucous. MucinexDM has a cough suppressant that can be used for cough and at night to stop the tickle in the back of your throat.    Use Nasal Saline to mechanically move any post nasal drip from your eustachian tubes or from the back of your throat.    Use Afrin in each nare, for no longer than 3 days, as it is addictive. It can also dry out your mucous membranes and cause elevated blood pressure.    Use Flonase 1-2 sprays/nostril per day. It is a local acting steroid nasal spray.  If you develop a bloody nose, stop using the medication immediately.    Sore throat:  Use warm, salt water gargles to ease your throat pain- 1/2 tsp salt to 1 cup warm water, gargle as desired.  Chloraseptic sprays and throat lozenges will also help to ease throat pain.     Sometimes Nyquil at night is beneficial to help you get some rest; however, it is sedating and does contain an antihistamine and Tylenol.  Make sure not to double up on these medications. Additionally you should not take this if you have high blood pressure.       These things will help you to feel better and will speed your recovery.  If your condition fails to improve in a timely manner, you should receive another evaluation by your Primary Care Provider/Pediatrician to discuss your concerns or return to urgent care for a recheck.  If your condition worsens at any time, you should report immediately to your nearest Emergency Department for further evaluation. **You must understand that you have received Urgent Care treatment only and that you may be released  before all of your medical problems are known or treated. You, the patient, are responsible to arrange for follow-up care as instructed.     ·   ·   ·   · Follow up with your primary care in 2-5 days if symptoms have not improved, or you may return here.  · If you were referred to a specialist, please follow up with that specialty.  · If you were prescribed antibiotics, please take them to completion.  · If you were prescribed a narcotic or any medication with sedative effects, do not drive or operate heavy equipment or machinery while taking these medications.  · You must understand that you have received treatment at an Urgent Care facility only, and that you may be released before all of your medical problems are known or treated. Urgent Care facilities are not equipped to handle life threatening emergencies. It is recommended that you go to an Emergency Department for further evaluation of worsening or concerning symptoms, or possibly life threatening conditions as discussed.                                        If you  smoke, please stop smoking

## 2020-01-04 NOTE — PATIENT INSTRUCTIONS
Elevated Blood Pressure  Your blood pressure was elevated during your visit to the urgent care today.  It was not so high that immediate care was needed, but it is recommended that you monitor your blood pressure over the next week or two to make sure that it is not staying elevated.  If you are on blood pressure medication currently, continue as already prescribed. Please have your blood pressure taken 2-3 times daily at different times of the day.  Keep a log of these blood pressure readings and take it with you to see your Primary Care Physician.  Bring today's discharge papers as well to your follow up appointment. If your blood pressure is consistently above 140/90, you should follow-up with your PCP without delay. If you develop chest pain, shortness of breath, dizziness, vision changes, or any other concerning symptoms, you should seek immediate care in the Emergency Department.       Watch your blood sugar during times of illness more frequently, 2-3 times daily, for the next several days. Seek emergency care any blood sugars over 300 or any concerning symptoms.    Shingles (Herpes Zoster)     Talk to your healthcare provider about the shingles vaccine.     Shingles is also called herpes zoster. It is a painful skin rash caused by the herpes zoster virus. This is the same virus that causes chickenpox. After a person has chickenpox, the virus remains inactive in the nerve cells. Years later, the virus can become active again and travel to the skin. Most people have shingles only once, but it is possible to have it more than once.  What are the risk factors for shingles?  Anyone who has ever had chickenpox can develop shingles. But your risk is greater if you:  Are 50 years of age or older  Have an illness that weakens your immune system, such as HIV/AIDS  Have cancer, especially Hodgkin disease or lymphoma  Take medicines that weaken your immune system  What are the symptoms of shingles?  The first sign of  shingles is usually pain, burning, tingling, or itching on one part of your face or body. You may also feel as if you have the flu, with fever and chills.  A red rash with small blisters appears within a few days. The rash may appear as follows:   The blisters can occur anywhere, but theyre most common on the back, chest, or abdomen.  They usually appear on only one side of the body, spreading along the nerve pathway where the virus was inactive.   The rash can also form around an eye, along one side of the face or neck, or in the mouth.  In a few people, usually those with weakened immune systems, shingles appear on more than one part of the body at once.  After a few days, the blisters become dry and form a crust. The crust falls off in days to weeks. The blisters generally do not leave scars.  How is shingles treated?  For most people, shingles heals on its own in a few weeks. But treatment is recommended to help relieve pain, speed healing, and reduce the risk of complications. Antiviral medicines are prescribed within the first 72 hours of the appearance of the rash. To lessen symptoms:  Apply ice packs (wrapped in a thin towel) or cool compresses, or soak in a cool bath.  Use calamine lotion to calm itchy skin.  Ask your healthcare provider about over-the-counter pain relievers. If your pain is severe, your healthcare provider may prescribe stronger pain medicines.  What are the complications of shingles?  Shingles often goes away with no lasting effects. But some people have serious problems long after the blisters have healed:  Postherpetic neuralgia. This is the most common complication. It is severe nerve pain at the place where the rash used to be. It can last for months, or even years after you have had shingles. Medicines can be prescribed to help relieve the pain and improve quality of life.  Bacterial infection. Shingles blisters may become infected with bacteria. Antibiotic medicine is used to treat  the infection.  Eye problems. A person with shingles on the face should see his or her healthcare provider right away. Shingles can cause serious problems with vision, and even blindness.  Very rarely shingles can also lead to pneumonia, hearing problems, brain inflammation, or even death.   When to seek medical care  Contact your healthcare provider if you experience any of the following:  Symptoms that dont go away with treatment  A rash or blisters near your eye  Increased drainage, fever, or rash after treatment, or severe pain that doesnt go away   How can shingles be prevented?  You can only get shingles if you have had chicken pox in the past. Those who have never had chickenpox can get the virus from you. Although instead of developing shingles, the person may get chickenpox. Until your blisters form scabs, avoid contact with others, especially the following:  Pregnant women who have never had chickenpox or the vaccine  Infants who were born early (prematurely) or who had low weight at birth  People with weak immune system (for example, people receiving chemotherapy for cancer, people who have had organ transplants, or people with HIV infections)     The shingles vaccine  If youre 60 years of age or older, ask your healthcare provider if you should receive the shingles vaccine. The vaccine makes it less likely that you will develop shingles. If you do develop shingles, your symptoms will likely be milder than if you hadnt been vaccinated. Note: Although the vaccine is licensed for people 50 years of age or older, the CDC does not recommend the vaccine for those who are 50 to 59 years old.   Date Last Reviewed: 10/1/2016  © 3331-6366 [employer] [address] [place] [address], [place] [address]. All rights reserved. This information is not intended as a substitute for professional medical care. Always follow your healthcare professional's instructions.          1.  Take all antibiotics as  prescribed.  It is imperative that once you start them, you take them to completion unless otherwise directed.  You should not have left over antibiotics.     2.  For patients above 6 months of age who are not allergic to and are not on anticoagulants, you can alternate Tylenol and Motrin every 4-6 hours for fever above 100.4F and/or pain.  For patients less than 6 months of age, allergic to or intolerant to NSAIDS, have gastritis, gastric ulcers, or history of GI bleeds, are pregnant, or are on anticoagulant therapy, you can take Tylenol every 4 hours as needed for fever above 100.4F and/or pain.     3.  Rest and keep yourself well hydrated.  Drink hot liquids (coffee, water, tea, hot chocolate, or soup) 10-12 times a day for 5-7 days.  Put liquid in a mug and place in microwave for 2.5 - 3 minutes. Pour hot liquid into another mug not used to microwave the liquid (to avoid burning your mouth) then sniff the steam from the cup and sip the heated liquid.    4.  You can use these over the counter medications/remedies to help with your symptoms:     Runny Nose:  Use an antihistamine such as Claritin, Zyrtec or Allegra to help dry you out.     Congestion:  Use pseudoephedrine (behind the counter) for congestion- Pseudoephedrine 30 mg up to 240 mg /day. Warning:  It can raise your blood pressure and give you palpitations.  Coricidin HBP is okay to use if you have high blood pressure.     Use mucinex (guaifenisin) up to 2400mg/day to break up/loosen any mucous. MucinexDM has a cough suppressant that can be used for cough and at night to stop the tickle in the back of your throat.    Use Nasal Saline to mechanically move any post nasal drip from your eustachian tubes or from the back of your throat.    Use Afrin in each nare, for no longer than 3 days, as it is addictive. It can also dry out your mucous membranes and cause elevated blood pressure.    Use Flonase 1-2 sprays/nostril per day. It is a local acting steroid  nasal spray.  If you develop a bloody nose, stop using the medication immediately.    Sore throat:  Use warm, salt water gargles to ease your throat pain- 1/2 tsp salt to 1 cup warm water, gargle as desired.  Chloraseptic sprays and throat lozenges will also help to ease throat pain.     Sometimes Nyquil at night is beneficial to help you get some rest; however, it is sedating and does contain an antihistamine and Tylenol.  Make sure not to double up on these medications. Additionally you should not take this if you have high blood pressure.       These things will help you to feel better and will speed your recovery.  If your condition fails to improve in a timely manner, you should receive another evaluation by your Primary Care Provider/Pediatrician to discuss your concerns or return to urgent care for a recheck.  If your condition worsens at any time, you should report immediately to your nearest Emergency Department for further evaluation. **You must understand that you have received Urgent Care treatment only and that you may be released before all of your medical problems are known or treated. You, the patient, are responsible to arrange for follow-up care as instructed.     ·   ·   ·   · Follow up with your primary care in 2-5 days if symptoms have not improved, or you may return here.  · If you were referred to a specialist, please follow up with that specialty.  · If you were prescribed antibiotics, please take them to completion.  · If you were prescribed a narcotic or any medication with sedative effects, do not drive or operate heavy equipment or machinery while taking these medications.  · You must understand that you have received treatment at an Urgent Care facility only, and that you may be released before all of your medical problems are known or treated. Urgent Care facilities are not equipped to handle life threatening emergencies. It is recommended that you go to an Emergency Department for  further evaluation of worsening or concerning symptoms, or possibly life threatening conditions as discussed.                                        If you  smoke, please stop smoking

## 2020-01-06 ENCOUNTER — TELEPHONE (OUTPATIENT)
Dept: URGENT CARE | Facility: CLINIC | Age: 50
End: 2020-01-06

## 2020-01-06 NOTE — TELEPHONE ENCOUNTER
Family member at home number stated pt still at work. No answer on mobile number, no ability to leave message. Courtesy call to check on pt.

## 2020-02-05 ENCOUNTER — OFFICE VISIT (OUTPATIENT)
Dept: HEMATOLOGY/ONCOLOGY | Facility: CLINIC | Age: 50
End: 2020-02-05
Payer: MEDICARE

## 2020-02-05 ENCOUNTER — TELEPHONE (OUTPATIENT)
Dept: HEMATOLOGY/ONCOLOGY | Facility: CLINIC | Age: 50
End: 2020-02-05

## 2020-02-05 VITALS
HEIGHT: 69 IN | RESPIRATION RATE: 18 BRPM | DIASTOLIC BLOOD PRESSURE: 89 MMHG | WEIGHT: 273.56 LBS | OXYGEN SATURATION: 98 % | BODY MASS INDEX: 40.52 KG/M2 | TEMPERATURE: 99 F | SYSTOLIC BLOOD PRESSURE: 131 MMHG | HEART RATE: 110 BPM

## 2020-02-05 DIAGNOSIS — Z79.811 USE OF AROMATASE INHIBITORS: ICD-10-CM

## 2020-02-05 DIAGNOSIS — D05.12 DUCTAL CARCINOMA IN SITU (DCIS) OF LEFT BREAST: Primary | ICD-10-CM

## 2020-02-05 PROCEDURE — 99214 OFFICE O/P EST MOD 30 MIN: CPT | Mod: S$PBB,,, | Performed by: INTERNAL MEDICINE

## 2020-02-05 PROCEDURE — 99999 PR PBB SHADOW E&M-EST. PATIENT-LVL III: ICD-10-PCS | Mod: PBBFAC,,, | Performed by: INTERNAL MEDICINE

## 2020-02-05 PROCEDURE — 99999 PR PBB SHADOW E&M-EST. PATIENT-LVL III: CPT | Mod: PBBFAC,,, | Performed by: INTERNAL MEDICINE

## 2020-02-05 PROCEDURE — 99213 OFFICE O/P EST LOW 20 MIN: CPT | Mod: PBBFAC | Performed by: INTERNAL MEDICINE

## 2020-02-05 PROCEDURE — 99214 PR OFFICE/OUTPT VISIT, EST, LEVL IV, 30-39 MIN: ICD-10-PCS | Mod: S$PBB,,, | Performed by: INTERNAL MEDICINE

## 2020-02-05 RX ORDER — BALOXAVIR MARBOXIL 40 MG/1
TABLET, FILM COATED ORAL
COMMUNITY
Start: 2019-12-19 | End: 2023-03-22

## 2020-02-05 RX ORDER — DULAGLUTIDE 0.75 MG/.5ML
INJECTION, SOLUTION SUBCUTANEOUS
COMMUNITY
Start: 2020-01-17 | End: 2021-03-22

## 2020-02-05 RX ORDER — LISINOPRIL 10 MG/1
TABLET ORAL
COMMUNITY
Start: 2020-01-14 | End: 2023-03-22

## 2020-02-05 NOTE — TELEPHONE ENCOUNTER
----- Message from Conner Lucas MD sent at 2/5/2020  2:52 PM CST -----  Needs a right axillary ultrasound ASAP.

## 2020-02-05 NOTE — TELEPHONE ENCOUNTER
Contacted patient to inform her that her US of her breast has been scheduled for Wednesday 2/12/2020 per her request at 140 p.m.  She confirmed and expressed gratitude.

## 2020-02-05 NOTE — PROGRESS NOTES
Subjective:       Patient ID: Allegra Reese is a 49 y.o. female.    Chief Complaint: No chief complaint on file.    HPI   Mrs. Reese returns today for follow up.   In late January 2017 she underwent laparoscopic oophorectomies.  She remains on AIs.   Briefly, she is a 49-year-old  female from [place] who was diagnosed with breast cancer and on 04/27/2016 underwent bilateral mastectomies with reconstruction.   There was no invasive cancer or DCIS in the right breast; in the left breast she had three areas of an infiltrating ductal carcinoma measuring 11 mm, 5 mm, and 3 mm respectively.  One sentinel lymph node was positive and a full axillary dissection was performed with a total of five lymph nodes being positive.  She was tested and found to be negative for BRCA mutations.  Of note, her cancer was strongly ER and ID positive and HER 2 megative  She started adjuvant chemotherapy on June 24th 2016, and completed 4 cycles of AC and four cycles of paclitaxel.   She subsequently received XRT and was started on zoladex plus anastrazole.    Apparently she underwent a reconstruction on April 29, 2017, and two weeks alter she presented to the ED with SOB and was found to have PEs.  She was started on rivaroxaban, and is currently on 20 mg QHS.       Review of Systems    Overall today she feels OK.    Her  ECOG PS remains 1.  She denies any easy bruising, fevers, chills, night  sweats, weight loss, nausea, vomiting, diarrhea, constipation, diplopia, blurred vision, headaches, chest pain, palpitations, shortness of breath, breast pain, upper extremity pain, or difficulty ambulating.  The remainder of the ten-point ROS, including general, skin, lymph, H/N, cardiorespiratory, GI, , Neuro, Endocrine, and psychiatric is negative.       Objective:      Physical Exam    She is alert, oriented to time, place, person, pleasant, well      nourished, in no acute physical distress.                                     VITAL SIGNS:  Reviewed                                      HEENT: normal.  There are no nasal, oral, lip, gingival, auricular, lid,    or conjunctival lesions.  Mucosae are moist and pink, and there is no        thrush.  Pupils are equal, reactive to light and accommodation.              Extraocular muscle movements are intact.    Dentition is good.                                     NECK:  Supple without JVD, adenopathy, or thyromegaly.                       LUNGS:  Clear to auscultation without wheezing, rales, or rhonchi.           CARDIOVASCULAR:  Reveals an S1, S2, no murmurs, no rubs, no gallops.         ABDOMEN:  Soft, nontender, without organomegaly.  Bowel sounds are    present.   The abdominal incision has healed.                                                                 EXTREMITIES:  No cyanosis, clubbing, or edema.                               BREASTS:  She is status post bilateral mastectomies with free flap reconstructions.  The incisions have healed nicely.    There is a 2 cm hard nodule at the 9 o' clock position in the right reconstructed breast, representing fat necrosis.   This has been noted previously                        LYMPHATIC:  There is no cervical, left axillary, or supraclavicular adenopathy, however, there are 2 rather superficial nodules in the right axilla, most likely representing sebaceous cysts.   SKIN:  Warm and moist, without petechiae, rashes, induration, or ecchymoses.  There is mild hyperpigmentation within the radiation field on the left chest wall.         NEUROLOGIC:  DTRs are 0-1+ bilaterally, symmetrical, motor function is 5/5,  and cranial nerves are  within normal limits.  Fundi are sharp without papilledema.     Assessment:       1. Ductal carcinoma in situ (DCIS) of left breast    2. Use of aromatase inhibitors     3.     PE, possibly related to the administration of AIs.   4       Abnormal clinical findings as described above  Plan:          I had a long  discussion with her;  She will remain on anastrazole for now, and she will complete her 5 years in December 2021.  I have explained to her that given her N2 disease I will most likely recommend that she take anastrozole for a total of 10 years.  In regards to her PE, I recommended that she remain on rivaroxaban for the whole time that she is on AIs.   Finally, in regards to her axillary nodules, I I think that the most likely represent small cysts.  Out of abundance of caution we will proceed with an axillary ultrasound.  RTC 4 months with a DXA scan.  Her questions were answered to her satisfaction.

## 2020-02-12 ENCOUNTER — HOSPITAL ENCOUNTER (OUTPATIENT)
Dept: RADIOLOGY | Facility: HOSPITAL | Age: 50
Discharge: HOME | End: 2020-02-12
Attending: INTERNAL MEDICINE
Payer: MEDICARE

## 2020-02-12 VITALS — WEIGHT: 273 LBS | BODY MASS INDEX: 40.32 KG/M2

## 2020-02-12 DIAGNOSIS — D05.12 DUCTAL CARCINOMA IN SITU (DCIS) OF LEFT BREAST: ICD-10-CM

## 2020-02-12 PROCEDURE — 77065 MAMMO DIGITAL DIAGNOSTIC RIGHT WITH TOMOSYNTHESIS_CAD: ICD-10-PCS | Mod: 26,,, | Performed by: RADIOLOGY

## 2020-02-12 PROCEDURE — 77061 BREAST TOMOSYNTHESIS UNI: CPT | Mod: 26,,, | Performed by: RADIOLOGY

## 2020-02-12 PROCEDURE — 77061 BREAST TOMOSYNTHESIS UNI: CPT | Mod: TC,PO

## 2020-02-12 PROCEDURE — 77065 DX MAMMO INCL CAD UNI: CPT | Mod: 26,,, | Performed by: RADIOLOGY

## 2020-02-12 PROCEDURE — 76642 ULTRASOUND BREAST LIMITED: CPT | Mod: 26,RT,, | Performed by: RADIOLOGY

## 2020-02-12 PROCEDURE — 76642 ULTRASOUND BREAST LIMITED: CPT | Mod: TC,PO,RT

## 2020-02-12 PROCEDURE — 77061 MAMMO DIGITAL DIAGNOSTIC RIGHT WITH TOMOSYNTHESIS_CAD: ICD-10-PCS | Mod: 26,,, | Performed by: RADIOLOGY

## 2020-02-12 PROCEDURE — 77065 DX MAMMO INCL CAD UNI: CPT | Mod: TC,PO

## 2020-02-12 PROCEDURE — 76642 US BREAST RIGHT LIMITED: ICD-10-PCS | Mod: 26,RT,, | Performed by: RADIOLOGY

## 2020-04-15 DIAGNOSIS — Z79.811 USE OF AROMATASE INHIBITORS: ICD-10-CM

## 2020-04-15 RX ORDER — ANASTROZOLE 1 MG/1
1 TABLET ORAL DAILY
Qty: 90 TABLET | Refills: 2 | Status: SHIPPED | OUTPATIENT
Start: 2020-04-15 | End: 2020-12-10 | Stop reason: SDUPTHER

## 2020-06-18 ENCOUNTER — OFFICE VISIT (OUTPATIENT)
Dept: HEMATOLOGY/ONCOLOGY | Facility: CLINIC | Age: 50
End: 2020-06-18
Payer: MEDICARE

## 2020-06-18 VITALS
HEIGHT: 69 IN | SYSTOLIC BLOOD PRESSURE: 135 MMHG | OXYGEN SATURATION: 97 % | RESPIRATION RATE: 18 BRPM | TEMPERATURE: 98 F | BODY MASS INDEX: 39.8 KG/M2 | DIASTOLIC BLOOD PRESSURE: 84 MMHG | WEIGHT: 268.75 LBS | HEART RATE: 76 BPM

## 2020-06-18 DIAGNOSIS — Z79.811 USE OF AROMATASE INHIBITORS: ICD-10-CM

## 2020-06-18 DIAGNOSIS — C50.612 CARCINOMA OF AXILLARY TAIL OF LEFT BREAST IN FEMALE, ESTROGEN RECEPTOR POSITIVE: Primary | ICD-10-CM

## 2020-06-18 DIAGNOSIS — Z17.0 CARCINOMA OF AXILLARY TAIL OF LEFT BREAST IN FEMALE, ESTROGEN RECEPTOR POSITIVE: Primary | ICD-10-CM

## 2020-06-18 PROCEDURE — 99999 PR PBB SHADOW E&M-EST. PATIENT-LVL V: CPT | Mod: PBBFAC,,, | Performed by: INTERNAL MEDICINE

## 2020-06-18 PROCEDURE — 99214 PR OFFICE/OUTPT VISIT, EST, LEVL IV, 30-39 MIN: ICD-10-PCS | Mod: S$PBB,,, | Performed by: INTERNAL MEDICINE

## 2020-06-18 PROCEDURE — 99215 OFFICE O/P EST HI 40 MIN: CPT | Mod: PBBFAC | Performed by: INTERNAL MEDICINE

## 2020-06-18 PROCEDURE — 99999 PR PBB SHADOW E&M-EST. PATIENT-LVL V: ICD-10-PCS | Mod: PBBFAC,,, | Performed by: INTERNAL MEDICINE

## 2020-06-18 PROCEDURE — 99214 OFFICE O/P EST MOD 30 MIN: CPT | Mod: S$PBB,,, | Performed by: INTERNAL MEDICINE

## 2020-06-18 NOTE — PROGRESS NOTES
Subjective:       Patient ID: Allegra Reese is a 50 y.o. female.    Chief Complaint: No chief complaint on file.    HPI   Mrs. Reese returns today for follow up.   In late January 2017 she underwent laparoscopic oophorectomies.  She remains on AIs.   Briefly, she is a 49-year-old  female from [place] who was diagnosed with breast cancer and on 04/27/2016 underwent bilateral mastectomies with reconstruction.   There was no invasive cancer or DCIS in the right breast; in the left breast she had three areas of an infiltrating ductal carcinoma measuring 11 mm, 5 mm, and 3 mm respectively.  One sentinel lymph node was positive and a full axillary dissection was performed with a total of five lymph nodes being positive.  She was tested and found to be negative for BRCA mutations.  Of note, her cancer was strongly ER and WI positive and HER 2 megative  She started adjuvant chemotherapy on June 24th 2016, and completed 4 cycles of AC and four cycles of paclitaxel.   She subsequently received XRT and was started on zoladex plus anastrazole.    Apparently she underwent a reconstruction on April 29, 2017, and two weeks alter she presented to the ED with SOB and was found to have PEs.  She was started on rivaroxaban, and is currently on 20 mg QHS.       Review of Systems    Overall today she feels OK.  She does complain of mild stiffness of her hands.   Her  ECOG PS remains 1.  She denies any easy bruising, fevers, chills, night  sweats, weight loss, nausea, vomiting, diarrhea, constipation, diplopia, blurred vision, headaches, chest pain, palpitations, shortness of breath, breast pain, upper extremity pain, or difficulty ambulating.  The remainder of the ten-point ROS, including general, skin, lymph, H/N, cardiorespiratory, GI, , Neuro, Endocrine, and psychiatric is negative.       Objective:      Physical Exam    She is alert, oriented to time, place, person, pleasant, well      nourished, in no acute  physical distress.  She is here with her                                     VITAL SIGNS:  Reviewed                                      HEENT: normal.  There are no nasal, oral, lip, gingival, auricular, lid,    or conjunctival lesions.  Mucosae are moist and pink, and there is no        thrush.  Pupils are equal, reactive to light and accommodation.              Extraocular muscle movements are intact.    Dentition is good.                                     NECK:  Supple without JVD, adenopathy, or thyromegaly.                       LUNGS:  Clear to auscultation without wheezing, rales, or rhonchi.           CARDIOVASCULAR:  Reveals an S1, S2, no murmurs, no rubs, no gallops.         ABDOMEN:  Soft, nontender, without organomegaly.  Bowel sounds are    present.   The abdominal incision has healed.                                                                 EXTREMITIES:  No cyanosis, clubbing, or edema.                               BREASTS:  She is status post bilateral mastectomies with free flap reconstructions.  The incisions have healed nicely.    There is a 2 cm hard nodule at the 9 o' clock position in the right reconstructed breast, representing fat necrosis.   This has been noted previously.                       LYMPHATIC:  There is no cervical, left axillary, or supraclavicular adenopathy.   SKIN:  Warm and moist, without petechiae, rashes, induration, or ecchymoses.  There is mild hyperpigmentation within the radiation field on the left chest wall.         NEUROLOGIC:  DTRs are 0-1+ bilaterally, symmetrical, motor function is 5/5,  and cranial nerves are  within normal limits.  Fundi are sharp without papilledema.     Assessment:       1. 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 Use of aromatase inhibitors     3.     PE, possibly related to the administration of AIs.   4       Abnormal clinical findings as described above  Plan:          I had a  long discussion with her and her ;  She will remain on anastrazole for now, and she will complete her 5 years in December 2021.  I have again explained to her that given her N2 disease I will most likely recommend that she take anastrozole for a total of 10 years.  In regards to her PE, I recommended that she remain on rivaroxaban for the whole time that she is on AIs.     RTC 5 months.  Her DEXA scan will be repeated in April 2021.  Her questions were answered to her satisfaction.

## 2020-06-24 NOTE — PROGRESS NOTES
"Subjective:       Patient ID: Allegra Reese is a 49 y.o. female.    Vitals:  height is 5' 9" (1.753 m) and weight is 124.3 kg (274 lb). Her oral temperature is 98.8 °F (37.1 °C). Her blood pressure is 122/85 and her pulse is 102. Her respiration is 20 and oxygen saturation is 100%.     Chief Complaint: Sore Throat    Sore Throat    This is a recurrent problem. The current episode started yesterday. The problem has been gradually worsening. Neither side of throat is experiencing more pain than the other. There has been no fever. The pain is at a severity of 0/10. The patient is experiencing no pain. Associated symptoms include congestion, coughing, headaches and trouble swallowing. Pertinent negatives include no ear pain, shortness of breath, stridor or vomiting. Exposure to: Pt. was exposed to the flu. She has tried nothing (Zertec) for the symptoms. The treatment provided no relief.       Constitution: Positive for chills and fatigue. Negative for sweating and fever.   HENT: Positive for congestion, postnasal drip, sinus pressure, sore throat and trouble swallowing. Negative for ear pain, sinus pain and voice change.    Neck: Negative for painful lymph nodes.   Cardiovascular: Negative for chest pain.   Eyes: Negative for eye redness.   Respiratory: Positive for cough. Negative for chest tightness, sputum production, bloody sputum, COPD, shortness of breath, stridor, wheezing and asthma.    Gastrointestinal: Negative for nausea and vomiting.   Genitourinary: Negative for dysuria, frequency, urgency and urine decreased.   Musculoskeletal: Negative for muscle ache.   Skin: Negative for rash.   Allergic/Immunologic: Positive for immunocompromised state. Negative for seasonal allergies and asthma.   Neurological: Positive for headaches. Negative for dizziness, light-headedness and altered mental status.   Hematologic/Lymphatic: Negative for swollen lymph nodes.   Psychiatric/Behavioral: Negative for altered " mental status and confusion.       Objective:      Physical Exam   Constitutional: She is oriented to person, place, and time. She appears well-developed and well-nourished. She is cooperative.  Non-toxic appearance. She appears ill. No distress.   HENT:   Head: Normocephalic and atraumatic.   Right Ear: Hearing, tympanic membrane, external ear and ear canal normal.   Left Ear: Hearing, tympanic membrane, external ear and ear canal normal.   Nose: Mucosal edema and rhinorrhea present. No nasal deformity. No epistaxis. Right sinus exhibits no maxillary sinus tenderness and no frontal sinus tenderness. Left sinus exhibits no maxillary sinus tenderness and no frontal sinus tenderness.   Mouth/Throat: Uvula is midline and mucous membranes are normal. No trismus in the jaw. Normal dentition. No uvula swelling. Posterior oropharyngeal erythema present. No oropharyngeal exudate.   Eyes: Conjunctivae and lids are normal. No scleral icterus.   Neck: Trachea normal, full passive range of motion without pain and phonation normal. Neck supple. No neck rigidity. No edema and no erythema present.   Cardiovascular: Normal rate, regular rhythm, normal heart sounds, intact distal pulses and normal pulses.   Pulmonary/Chest: Effort normal and breath sounds normal. No respiratory distress. She has no decreased breath sounds. She has no rhonchi.   Abdominal: Normal appearance.   Musculoskeletal: Normal range of motion. She exhibits no edema or deformity.   Neurological: She is alert and oriented to person, place, and time. She exhibits normal muscle tone. Coordination normal.   Skin: Skin is warm, dry, intact, not diaphoretic and not pale.   Psychiatric: She has a normal mood and affect. Her speech is normal and behavior is normal. Judgment and thought content normal. Cognition and memory are normal.   Nursing note and vitals reviewed.        Assessment:       1. Influenza-like illness    2. Exposure to influenza        Plan:          Influenza-like illness  -     baloxavir marboxil 40 mg Tab; Take 80 mg by mouth once. for 1 dose  Dispense: 2 tablet; Refill: 0    Exposure to influenza  -     baloxavir marboxil 40 mg Tab; Take 80 mg by mouth once. for 1 dose  Dispense: 2 tablet; Refill: 0      Patient presentation consistent with influenza. POCT influenza obtained and negative. Due to low to moderate sensitivity and high specificity, negative rapid diagnostic tests do not exclude influenza and therefore will initiate empirical therapy with antiviral as suggested by CDC (https://www.cdc.gov). Patient does not present with any concrete signs/symptoms of pneumonia, systemic bacterial infection, or other complications, deferred CXR or further labwork at this time.      Differential diagnosis considered but not limited to strep throat, otitis media, sepsis, bronchiolitis, pneumonia.  These were thought to be less likely given the history and physical exam. Discussed influenza complications and instructed patient to seek medical attention immediately with new or worsening symptoms.     Patient Instructions     Influenza (Adult)    Influenza is also called the flu. It is a viral illness that affects the air passages of your lungs. It is different from the common cold. The flu can easily be passed from one to person to another. It may be spread through the air by coughing and sneezing. Or it can be spread by touching the sick person and then touching your own eyes, nose, or mouth.  The flu starts 1 to 3 days after you are exposed to the flu virus. It may last for 1 to 2 weeks but many people feel tired or fatigued for many weeks afterward. You usually dont need to take antibiotics unless you have a complication. This might be an ear or sinus infection or pneumonia.  Symptoms of the flu may be mild or severe. They can include extreme tiredness (wanting to stay in bed all day), chills, fevers, muscle aches, soreness with eye movement, headache, and a  dry, hacking cough.  Home care  Follow these guidelines when caring for yourself at home:  · Avoid being around cigarette smoke, whether yours or other peoples.  · Acetaminophen or ibuprofen will help ease your fever, muscle aches, and headache. Dont give aspirin to anyone younger than 18 who has the flu. Aspirin can harm the liver.  · Nausea and loss of appetite are common with the flu. Eat light meals. Drink 6 to 8 glasses of liquids every day. Good choices are water, sport drinks, soft drinks without caffeine, juices, tea, and soup. Extra fluids will also help loosen secretions in your nose and lungs.  · Over-the-counter cold medicines will not make the flu go away faster. But the medicines may help with coughing, sore throat, and congestion in your nose and sinuses. Dont use a decongestant if you have high blood pressure.  · Stay home until your fever has been gone for at least 24 hours without using medicine to reduce fever.  Follow-up care  Follow up with your healthcare provider, or as advised, if you are not getting better over the next week.  If you are age 65 or older, talk with your provider about getting a pneumococcal vaccine every 5 years. You should also get this vaccine if you have chronic asthma or COPD. All adults should get a flu vaccine every fall. Ask your provider about this.  When to seek medical advice  Call your healthcare provider right away if any of these occur:  · Cough with lots of colored mucus (sputum) or blood in your mucus  · Chest pain, shortness of breath, wheezing, or trouble breathing  · Severe headache, or face, neck, or ear pain  · New rash with fever  · Fever of 100.4°F (38°C) or higher, or as directed by your healthcare provider  · Confusion, behavior change, or seizure  · Severe weakness or dizziness  · You get a new fever or cough after getting better for a few days  Date Last Reviewed: 1/1/2017  © 5888-6414 [employer] [address] [place] [address], [facility] [place]  [address]. All rights reserved. This information is not intended as a substitute for professional medical care. Always follow your healthcare professional's instructions.      Upper Respiratory Infections    The common cold/ viral upper respiratory infection is an acute, self-limiting infection of the upper respiratory tract characterized by variable degrees of sneezing, nasal congestion and discharge (rhinorrhea), sore throat, cough, low grade fever, headache, and malaise.    Symptoms usually peak on day 2 to 3 of illness and then gradually improve over 7 to 14 days.  A cough may linger for 3 to 4 weeks but should steadily improve over time.       The following information is provided to help you in treating upper respiratory infections.    Decongestant Nasal Sprays  Over-the-counter decongestant nasal spray such as Afrin, may be helpful as an initial step in treating upper respiratory infections. This spray can be used for up to approximately 3 days and is used no more than twice per day. Topical nasal decongestant spray for longer than 5 days will result in a physical addiction, in which the nasal lining will become significantly swollen and irritated until the spray is used again.     Nasal Saline  Nasal saline is available over the counter. There are several different commercial preparations such as Ocean spray and Ayr spray. There is no limit on the use of Nasal saline. Saline is used by snorting the mist up into the nose then later gently blowing the nose to get rid of any secretions that it has loosened.    Nasal Steroids  Nasal steroid medications such as Flonase are useful for upper respiratory infections, allergies, and sensitivities to airborne irritants. Unfortunately, they do not begin to work for 1-2 days, and they do not reach their maximum benefit for approximately 2-3 weeks. Initial therapy is typically 2 puffs per nostril twice per day. This should be used for only a few days, then the maintenance  dosage is one or two puffs per nostril once per day. This can be done at any time of the day. The most effective way to use any nasal medication is to look down at your toes when spraying it in. Aim slightly away from the septum (dividing plate between the nostrils), and gently inhale. This ensures that the spray will go into the sinus cavities and not straight up into the nose. A good way to avoid spraying onto the septum is to use the right hand to spray into the left nostril, and vice versa for the right nostril. Occasionally, nasal steroids can increase the risk of nosebleeds, but in general they are very well tolerated and effective medications.    Antihistamines  Antihistamines are available both over-the-counter and as a prescription. There are also various decongestant and antihistamine combinations available such as Claritin, Allegra, and Zyrtec. It is best to take any antihistamine-decongestant combination in the morning to avoid insomnia. Zyrtec should probably be taken at night, in order to reduce the chance of sleepiness during the daytime. If there is a significant infection present and secretions are already thickened, it is recommended to discontinue antihistamines and use a mucous thinner/decongestant combination.    Mucous Thinners and Decongestants  Mucous thinners and decongestants are used to shrink down the tissues and promote sinus drainage. There are multiple prescription and over-the-counter varieties available. A mucous thinner will tend to be drying unless you are also drinking plenty of water when taking these. If you have high blood pressure, it is very important to monitor your pressure while on decongestants. The mucous thinner/decongestant combinations are typically given twice per day. However, some people will be unable to tolerate these at night and should only take them once per day.    Oral Steroids  Oral steroids can be used with more sever infections. Often, they are the only  medications that will reduce the symptoms of pressure and allow the nasal sinuses to drain. These are best taken on a full stomach and earlier in the day is better. They may give you some irritability, stomach upset, or hyperactivity. This can also interfere with sleep. A person who has high blood pressure or diabetes needs to be very careful to monitor their pressure or blood glucose while taking steroids. Steroids can have multiple side effects when taken long-term, but short-term doses are very well tolerated and extremely effective in controlling the symptoms associated with acute and chronic sinus infections, severe allergies, or nasal polyps. The only significant side effect of note with oral steroids is the extraordinarily uncommon occurrence of damage to the hip cartilage, which is very rare and is usually associated with long-term usage of steroids. The use of steroids for greater than approximately seven days requires a tapering down in order to discontinue them. You should not abruptly stop your steroid if you have been taking the same dose for greater than one week.    Antibiotic Treatment  Finally, when all of these other measures have failed, and a bacterial infection is present, an antibiotic will be prescribed. The most common symptoms of acute sinusitis of a bacterial nature are pain, pressure, and thick and colored nasal drainage. However, not all colored drainage means that there is a bacterial infection present. According to the Center for Disease Control, only 2% of colds will progress to result in bacterial sinusitis. Most upper respiratory infections should NOT be treated with antibiotics. Antibiotics should be reserved for upper respiratory infections which last longer than 10 days, or which worsen after 4 or 5 days of treatment. The use of antibiotics for nonbacterial upper respiratory infections has resulted in a severe problem with the emergence of bacteria which are resistant to multiple  forms of antibiotics, and some bacteria are currently only treatable with intravenous antibiotics.    Body Aches/Pains/Fever  For patients who are not allergic to and are not on anticoagulants, you can alternate Tylenol and Motrin every 4-6 hours for fever above 100.4F and/or pain.  For patients who are allergic or intolerant to NSAIDS, have gastritis, gastric ulcers, or history of GI bleeds, are pregnant, or are on anticoagulant therapy, you can take Tylenol every 4 hours as needed for fever above 100.4F and/or pain.     Maintain adequate hydration -  Rest and keep yourself/patient well hydrated. For adults, it is recommended to drink at least 8-10 glasses of water daily.  This may help thin secretions and soothe the respiratory mucosa.     Drink Hot Liquids (coffee, water, tea, hot chocolate or soup). Put liquid in a Mug and place in Microwave for 2 to 3 minutes. CHANGE THE CUP THAT WAS USED IN THE MICROWAVE SO AS NOT TO BURN YOUR MOUTH.  Sniff the steam from the cup and sip the heated liquid TEN TO TWELVE TIMES A DAY for 5 to 7 Days.    COUGH  A viral cough may linger for 3 to 4 weeks but should steadily improve over time.   Coughing is the body's natural way to clear mucus and help get rid of bacteria and viruses. Therefore, cough suppressants are usually not recomended.  Common cough suppressants include syrups with the ingredient dextromethorphan or DM, available over-the-counter, or prescription syrups containing codeine or hydrocone.  There is no proven benefit and have potential harms.       ?Honey may be beneficial, especially on nocturnal cough.   1 to 2 teaspoons can be taken straight or diluted in tea, juice or other liquid.    The antioxidants in honey are an important contributor to its decongestant properties. Generally speaking, darker honey contains more antioxidants. Buckwheat and avocado honey are particularly good choices. If these honeys are not available in your area, choose the darkest honey  you can find.        1.  Take all medications as directed. If you have been prescribed antibiotics, make sure to complete them.   2.  Rest and keep yourself/patient well hydrated. For adults, it is recommended to drink at least 8-10 glasses of water daily.   3.  For patients above 6 months of age who are not allergic to and are not on anticoagulants, you can alternate Tylenol and Motrin every 4-6 hours for fever above 100.4F and/or pain.  For patients less than 6 months of age, allergic to or intolerant to NSAIDS, have gastritis, gastric ulcers, or history of GI bleeds, are pregnant, or are on anticoagulant therapy, you can take Tylenol every 4 hours as needed for fever above 100.4F and/or pain.   4. You should schedule a follow-up appointment with your Primary Care Provider/Pediatrician for recheck in 2-3 days or as directed at this visit.   5.  If your condition fails to improve in a timely manner, you should receive another evaluation by your Primary Care Provider/Pediatrician to discuss your concerns or return to urgent care for a recheck.  If your condition worsens at any time, you should report immediately to your nearest Emergency Department for further evaluation. **You must understand that you have received Urgent Care treatment only and that you may be released before all of your medical problems are known or treated. You, the patient, are responsible to arrange for follow-up care as instructed.               Keystone Flap Text: The defect edges were debeveled with a #15 scalpel blade.  Given the location of the defect, shape of the defect a keystone flap was deemed most appropriate.  Using a sterile surgical marker, an appropriate keystone flap was drawn incorporating the defect, outlining the appropriate donor tissue and placing the expected incisions within the relaxed skin tension lines where possible. The area thus outlined was incised deep to adipose tissue with a #15 scalpel blade.  The skin margins were undermined to an appropriate distance in all directions around the primary defect and laterally outward around the flap utilizing iris scissors.

## 2020-07-16 ENCOUNTER — OFFICE VISIT (OUTPATIENT)
Dept: URGENT CARE | Facility: CLINIC | Age: 50
End: 2020-07-16
Payer: MEDICARE

## 2020-07-16 VITALS
BODY MASS INDEX: 40.29 KG/M2 | WEIGHT: 272 LBS | HEART RATE: 84 BPM | TEMPERATURE: 97 F | HEIGHT: 69 IN | OXYGEN SATURATION: 98 % | SYSTOLIC BLOOD PRESSURE: 132 MMHG | DIASTOLIC BLOOD PRESSURE: 87 MMHG

## 2020-07-16 DIAGNOSIS — R30.0 DYSURIA: ICD-10-CM

## 2020-07-16 DIAGNOSIS — N30.00 ACUTE CYSTITIS WITHOUT HEMATURIA: Primary | ICD-10-CM

## 2020-07-16 LAB
BILIRUB UR QL STRIP: POSITIVE
GLUCOSE UR QL STRIP: NEGATIVE
KETONES UR QL STRIP: NEGATIVE
LEUKOCYTE ESTERASE UR QL STRIP: POSITIVE
PH, POC UA: 6
POC BLOOD, URINE: NEGATIVE
POC NITRATES, URINE: NEGATIVE
PROT UR QL STRIP: NEGATIVE
SP GR UR STRIP: 1.02 (ref 1–1.03)
UROBILINOGEN UR STRIP-ACNC: NORMAL (ref 0.1–1.1)

## 2020-07-16 PROCEDURE — 81003 POCT URINALYSIS, DIPSTICK, AUTOMATED, W/O SCOPE: ICD-10-PCS | Mod: QW,S$GLB,, | Performed by: PHYSICIAN ASSISTANT

## 2020-07-16 PROCEDURE — 81003 URINALYSIS AUTO W/O SCOPE: CPT | Mod: QW,S$GLB,, | Performed by: PHYSICIAN ASSISTANT

## 2020-07-16 PROCEDURE — 99214 OFFICE O/P EST MOD 30 MIN: CPT | Mod: 25,S$GLB,, | Performed by: PHYSICIAN ASSISTANT

## 2020-07-16 PROCEDURE — 99214 PR OFFICE/OUTPT VISIT, EST, LEVL IV, 30-39 MIN: ICD-10-PCS | Mod: 25,S$GLB,, | Performed by: PHYSICIAN ASSISTANT

## 2020-07-16 RX ORDER — NITROFURANTOIN 25; 75 MG/1; MG/1
100 CAPSULE ORAL 2 TIMES DAILY
Qty: 14 CAPSULE | Refills: 0 | Status: SHIPPED | OUTPATIENT
Start: 2020-07-16 | End: 2020-07-23

## 2020-07-16 RX ORDER — FLUCONAZOLE 150 MG/1
150 TABLET ORAL ONCE
Qty: 1 TABLET | Refills: 1 | Status: SHIPPED | OUTPATIENT
Start: 2020-07-16 | End: 2020-07-16

## 2020-07-16 RX ORDER — PHENAZOPYRIDINE HYDROCHLORIDE 200 MG/1
200 TABLET, FILM COATED ORAL 3 TIMES DAILY PRN
Qty: 21 TABLET | Refills: 0 | Status: SHIPPED | OUTPATIENT
Start: 2020-07-16 | End: 2021-07-16

## 2020-07-16 NOTE — PATIENT INSTRUCTIONS
"· Follow up with your primary care if symptoms do not improve, or you may return here at any time.  · If you were referred to a specialist, please follow up with that specialty.  · If you were prescribed antibiotics, please take them to completion.  · If you were prescribed a narcotic or any medication with sedative effects, do not drive or operate heavy equipment or machinery while taking these medications.  · You must understand that you have received treatment at an Urgent Care facility only, and that you may be released before all of your medical problems are known or treated. Urgent Care facilities are not equipped to handle life threatening emergencies. It is recommended that you seek care at an Emergency Department for further evaluation of worsening or concerning symptoms, or possibly life threatening conditions as discussed.                                        If you  smoke, please stop smoking               PLEASE PRACTICE SOCIAL DISTANCING            Bladder Infection, Female (Adult)    Urine is normally doesn't have any bacteria in it. But bacteria can get into the urinary tract from the skin around the rectum. Or they can travel in the blood from elsewhere in the body. Once they are in your urinary tract, they can cause infection in the urethra (urethritis), the bladder (cystitis), or the kidneys (pyelonephritis).  The most common place for an infection is in the bladder. This is called a bladder infection. This is one of the most common infections in women. Most bladder infections are easily treated. They are not serious unless the infection spreads to the kidney.  The phrases "bladder infection," "UTI," and "cystitis" are often used to describe the same thing. But they are not always the same. Cystitis is an inflammation of the bladder. The most common cause of cystitis is an infection.  Symptoms  The infection causes inflammation in the urethra and bladder. This causes many of the symptoms. The " most common symptoms of a bladder infection are:  · Pain or burning when urinating  · Having to urinate more often than usual  · Urgent need to urinate  · Only a small amount of urine comes out  · Blood in urine  · Abdominal discomfort. This is usually in the lower abdomen above the pubic bone.  · Cloudy urine  · Strong- or bad-smelling urine  · Unable to urinate (urinary retention)  · Unable to hold urine in (urinary incontinence)  · Fever  · Loss of appetite  · Confusion (in older adults)  Causes  Bladder infections are not contagious. You can't get one from someone else, from a toilet seat, or from sharing a bath.  The most common cause of bladder infections is bacteria from the bowels. The bacteria get onto the skin around the opening of the urethra. From there, they can get into the urine and travel up to the bladder, causing inflammation and infection. This usually happens because of:  · Wiping improperly after urinating. Always wipe from front to back.  · Bowel incontinence  · Pregnancy  · Procedures such as having a catheter inserted  · Older age  · Not emptying your bladder. This can allow bacteria a chance to grow in your urine.  · Dehydration  · Constipation  · Sex  · Use of a diaphragm for birth control   Treatment  Bladder infections are diagnosed by a urine test. They are treated with antibiotics and usually clear up quickly without complications. Treatment helps prevent a more serious kidney infection.  Medicines  Medicines can help in the treatment of a bladder infection:  · Take antibiotics until they are used up, even if you feel better. It is important to finish them to make sure the infection has cleared.  · You can use acetaminophen or ibuprofen for pain, fever, or discomfort, unless another medicine was prescribed. If you have chronic liver or kidney disease, talk with your healthcare provider before using these medicines. Also talk with your provider if you've ever had a stomach ulcer or  gastrointestinal bleeding, or are taking blood-thinner medicines.  · If you are given phenazopydridine to reduce burning with urination, it will cause your urine to become a bright orange color. This can stain clothing.  Care and prevention  These self-care steps can help prevent future infections:  · Drink plenty of fluids to prevent dehydration and flush out your bladder. Do this unless you must restrict fluids for other health reasons, or your doctor told you not to.  · Proper cleaning after going to the bathroom is important. Wipe from front to back after using the toilet to prevent the spread of bacteria.  · Urinate more often. Don't try to hold urine in for a long time.  · Wear loose-fitting clothes and cotton underwear. Avoid tight-fitting pants.  · Improve your diet and prevent constipation. Eat more fresh fruit and vegetables, and fiber, and less junk and fatty foods.  · Avoid sex until your symptoms are gone.  · Avoid caffeine, alcohol, and spicy foods. These can irritate your bladder.  · Urinate right after intercourse to flush out your bladder.  · If you use birth control pills and have frequent bladder infections, discuss it with your doctor.  Follow-up care  Call your healthcare provider if all symptoms are not gone after 3 days of treatment. This is especially important if you have repeat infections.  If a culture was done, you will be told if your treatment needs to be changed. If directed, you can call to find out the results.  If X-rays were done, you will be told if the results will affect your treatment.  Call 911  Call 911 if any of the following occur:  · Trouble breathing  · Hard to wake up or confusion  · Fainting or loss of consciousness  · Rapid heart rate  When to seek medical advice  Call your healthcare provider right away if any of these occur:  · Fever of 100.4ºF (38.0ºC) or higher, or as directed by your healthcare provider  · Symptoms are not better by the third day of  treatment  · Back or belly (abdominal) pain that gets worse  · Repeated vomiting, or unable to keep medicine down  · Weakness or dizziness  · Vaginal discharge  · Pain, redness, or swelling in the outer vaginal area (labia)  Date Last Reviewed: 10/1/2016  © 4240-1898 [employer]. [address] [place] [id number]. All rights reserved. This information is not intended as a substitute for professional medical care. Always follow your healthcare professional's instructions.

## 2020-07-16 NOTE — PROGRESS NOTES
"Subjective:       Patient ID: Allegra Reese is a 50 y.o. female.    Vitals:  height is 5' 9" (1.753 m) and weight is 123.4 kg (272 lb). Her oral temperature is 97.3 °F (36.3 °C). Her blood pressure is 132/87 and her pulse is 84. Her oxygen saturation is 98%.     Chief Complaint: Dysuria    Dysuria   This is a new problem. The current episode started today. The problem occurs intermittently. The problem has been waxing and waning. The pain is mild. There has been no fever. There is no history of pyelonephritis. Pertinent negatives include no chills, frequency, hematuria, nausea, urgency, vomiting or rash. She has tried nothing for the symptoms. Her past medical history is significant for diabetes mellitus. There is no history of catheterization, diabetes insipidus, genitourinary reflux, hypertension, kidney stones, recurrent UTIs, a single kidney, STD, urinary stasis or a urological procedure.       Constitution: Negative for chills, sweating, fatigue and fever.   Neck: Negative for painful lymph nodes.   Gastrointestinal: Positive for abdominal pain ("discomfort"). Negative for nausea and vomiting.   Genitourinary: Positive for dysuria. Negative for frequency, urgency, urine decreased, hematuria, history of kidney stones, painful menstruation, irregular menstruation, missed menses, heavy menstrual bleeding, ovarian cysts, genital trauma, vaginal pain, vaginal discharge, vaginal bleeding, vaginal odor, painful intercourse, genital sore, painful ejaculation and pelvic pain.   Musculoskeletal: Negative for back pain.   Skin: Negative for rash and lesion.   Hematologic/Lymphatic: Negative for swollen lymph nodes.       Objective:      Physical Exam   Constitutional: She is oriented to person, place, and time. She appears well-developed.   HENT:   Head: Normocephalic and atraumatic.   Ears:   Right Ear: External ear normal.   Left Ear: External ear normal.   Nose: Nose normal. No nasal deformity. No epistaxis. "   Mouth/Throat: Oropharynx is clear and moist and mucous membranes are normal.   Eyes: Conjunctivae and lids are normal.   Neck: Trachea normal, normal range of motion, full passive range of motion without pain and phonation normal. Neck supple.   Cardiovascular: Normal rate, regular rhythm, normal heart sounds and normal pulses.   Pulmonary/Chest: Effort normal and breath sounds normal. No respiratory distress.   Abdominal: Soft. Normal appearance and bowel sounds are normal. She exhibits no distension, no abdominal bruit, no pulsatile midline mass and no mass. There is no abdominal tenderness. There is no guarding.   Musculoskeletal: Normal range of motion.   Neurological: She is alert and oriented to person, place, and time. She has normal strength.   Skin: Skin is warm, dry, intact, not diaphoretic and not pale. Psychiatric: Her speech is normal and behavior is normal. Judgment and thought content normal.   Nursing note and vitals reviewed.    Office Visit on 07/16/2020   Component Date Value Ref Range Status    POC Blood, Urine 07/16/2020 Negative  Negative Final    POC Bilirubin, Urine 07/16/2020 Positive* Negative Final    0.5 mg/dl    POC Urobilinogen, Urine 07/16/2020 normal  0.1 - 1.1 Final    POC Ketones, Urine 07/16/2020 Negative  Negative Final    POC Protein, Urine 07/16/2020 Negative  Negative Final    POC Nitrates, Urine 07/16/2020 Negative  Negative Final    POC Glucose, Urine 07/16/2020 Negative  Negative Final    pH, UA 07/16/2020 6.0   Final    POC Specific Gravity, Urine 07/16/2020 1.020  1.003 - 1.029 Final    POC Leukocytes, Urine 07/16/2020 Positive* Negative Final    25 WBC/ul           Assessment:       1. Acute cystitis without hematuria    2. Dysuria        Plan:       All hx was provided by the pt or available as part of established EMR. The pt past medical hx, family hx, social hx, and current medications were reviewed. Interpretation of diagnostics performed today were  discussed. Pt to follow up with [facility] if no improvement or for any concern, and seek treatment in an ER for worsening as discussed. Tx options and relevant risks and benefits discussed. Pt voiced understanding of all discussed, and agreed with decision making.    Acute cystitis without hematuria  -     Urine culture  -     nitrofurantoin, macrocrystal-monohydrate, (MACROBID) 100 MG capsule; Take 1 capsule (100 mg total) by mouth 2 (two) times daily. for 7 days  Dispense: 14 capsule; Refill: 0  -     phenazopyridine (PYRIDIUM) 200 MG tablet; Take 1 tablet (200 mg total) by mouth 3 (three) times daily as needed for Pain.  Dispense: 21 tablet; Refill: 0    Dysuria  -     POCT Urinalysis, Dipstick, Automated, W/O Scope    Other orders  -     fluconazole (DIFLUCAN) 150 MG Tab; Take 1 tablet (150 mg total) by mouth once. May repeat in 1 week if not improved for 1 dose  Dispense: 1 tablet; Refill: 1      Patient Instructions   · Follow up with your primary care if symptoms do not improve, or you may return here at any time.  · If you were referred to a specialist, please follow up with that specialty.  · If you were prescribed antibiotics, please take them to completion.  · If you were prescribed a narcotic or any medication with sedative effects, do not drive or operate heavy equipment or machinery while taking these medications.  · You must understand that you have received treatment at an Urgent Care facility only, and that you may be released before all of your medical problems are known or treated. Urgent Care facilities are not equipped to handle life threatening emergencies. It is recommended that you seek care at an Emergency Department for further evaluation of worsening or concerning symptoms, or possibly life threatening conditions as discussed.                                        If you  smoke, please stop smoking               PLEASE PRACTICE SOCIAL DISTANCING            Bladder Infection, Female  "(Adult)    Urine is normally doesn't have any bacteria in it. But bacteria can get into the urinary tract from the skin around the rectum. Or they can travel in the blood from elsewhere in the body. Once they are in your urinary tract, they can cause infection in the urethra (urethritis), the bladder (cystitis), or the kidneys (pyelonephritis).  The most common place for an infection is in the bladder. This is called a bladder infection. This is one of the most common infections in women. Most bladder infections are easily treated. They are not serious unless the infection spreads to the kidney.  The phrases "bladder infection," "UTI," and "cystitis" are often used to describe the same thing. But they are not always the same. Cystitis is an inflammation of the bladder. The most common cause of cystitis is an infection.  Symptoms  The infection causes inflammation in the urethra and bladder. This causes many of the symptoms. The most common symptoms of a bladder infection are:  · Pain or burning when urinating  · Having to urinate more often than usual  · Urgent need to urinate  · Only a small amount of urine comes out  · Blood in urine  · Abdominal discomfort. This is usually in the lower abdomen above the pubic bone.  · Cloudy urine  · Strong- or bad-smelling urine  · Unable to urinate (urinary retention)  · Unable to hold urine in (urinary incontinence)  · Fever  · Loss of appetite  · Confusion (in older adults)  Causes  Bladder infections are not contagious. You can't get one from someone else, from a toilet seat, or from sharing a bath.  The most common cause of bladder infections is bacteria from the bowels. The bacteria get onto the skin around the opening of the urethra. From there, they can get into the urine and travel up to the bladder, causing inflammation and infection. This usually happens because of:  · Wiping improperly after urinating. Always wipe from front to back.  · Bowel " incontinence  · Pregnancy  · Procedures such as having a catheter inserted  · Older age  · Not emptying your bladder. This can allow bacteria a chance to grow in your urine.  · Dehydration  · Constipation  · Sex  · Use of a diaphragm for birth control   Treatment  Bladder infections are diagnosed by a urine test. They are treated with antibiotics and usually clear up quickly without complications. Treatment helps prevent a more serious kidney infection.  Medicines  Medicines can help in the treatment of a bladder infection:  · Take antibiotics until they are used up, even if you feel better. It is important to finish them to make sure the infection has cleared.  · You can use acetaminophen or ibuprofen for pain, fever, or discomfort, unless another medicine was prescribed. If you have chronic liver or kidney disease, talk with your healthcare provider before using these medicines. Also talk with your provider if you've ever had a stomach ulcer or gastrointestinal bleeding, or are taking blood-thinner medicines.  · If you are given phenazopydridine to reduce burning with urination, it will cause your urine to become a bright orange color. This can stain clothing.  Care and prevention  These self-care steps can help prevent future infections:  · Drink plenty of fluids to prevent dehydration and flush out your bladder. Do this unless you must restrict fluids for other health reasons, or your doctor told you not to.  · Proper cleaning after going to the bathroom is important. Wipe from front to back after using the toilet to prevent the spread of bacteria.  · Urinate more often. Don't try to hold urine in for a long time.  · Wear loose-fitting clothes and cotton underwear. Avoid tight-fitting pants.  · Improve your diet and prevent constipation. Eat more fresh fruit and vegetables, and fiber, and less junk and fatty foods.  · Avoid sex until your symptoms are gone.  · Avoid caffeine, alcohol, and spicy foods. These can  irritate your bladder.  · Urinate right after intercourse to flush out your bladder.  · If you use birth control pills and have frequent bladder infections, discuss it with your doctor.  Follow-up care  Call your healthcare provider if all symptoms are not gone after 3 days of treatment. This is especially important if you have repeat infections.  If a culture was done, you will be told if your treatment needs to be changed. If directed, you can call to find out the results.  If X-rays were done, you will be told if the results will affect your treatment.  Call 911  Call 911 if any of the following occur:  · Trouble breathing  · Hard to wake up or confusion  · Fainting or loss of consciousness  · Rapid heart rate  When to seek medical advice  Call your healthcare provider right away if any of these occur:  · Fever of 100.4ºF (38.0ºC) or higher, or as directed by your healthcare provider  · Symptoms are not better by the third day of treatment  · Back or belly (abdominal) pain that gets worse  · Repeated vomiting, or unable to keep medicine down  · Weakness or dizziness  · Vaginal discharge  · Pain, redness, or swelling in the outer vaginal area (labia)  Date Last Reviewed: 10/1/2016  © 9971-1118 [employer]. [address] [place] [address], [place] [address]. All rights reserved. This information is not intended as a substitute for professional medical care. Always follow your healthcare professional's instructions.

## 2020-07-21 LAB
BACTERIA UR CULT: NORMAL
BACTERIA UR CULT: NORMAL

## 2020-07-23 ENCOUNTER — TELEPHONE (OUTPATIENT)
Dept: URGENT CARE | Facility: CLINIC | Age: 50
End: 2020-07-23

## 2020-11-20 NOTE — PROGRESS NOTES
Subjective:       Patient ID: Allegra Reese is a 50 y.o. female.    Chief Complaint: No chief complaint on file.    HPI     Mrs. Reese returns today for follow up.   In late January 2017 she underwent laparoscopic oophorectomies.  She remains on AIs.   Briefly, she is a 50-year-old  female from [place] who was diagnosed with breast cancer and on 04/27/2016 underwent bilateral mastectomies with reconstruction.   There was no invasive cancer or DCIS in the right breast; in the left breast she had three areas of an infiltrating ductal carcinoma measuring 11 mm, 5 mm, and 3 mm respectively.  One sentinel lymph node was positive and a full axillary dissection was performed with a total of five lymph nodes being positive.  She was tested and found to be negative for BRCA mutations.  Of note, her cancer was strongly ER and MI positive and HER 2 megative  She started adjuvant chemotherapy on June 24th 2016, and completed 4 cycles of AC and four cycles of paclitaxel.   She subsequently received XRT and was started on zoladex plus anastrazole.    Apparently she underwent a reconstruction on April 29, 2017, and two weeks alter she presented to the ED with SOB and was found to have PEs.  She was started on rivaroxaban, and is currently on 20 mg QHS.       Review of Systems    Overall today she feels OK.  She does complain of mild stiffness of her hands and also pains and her ankles and knees.   Her  ECOG PS remains 1.  She denies any easy bruising, fevers, chills, night  sweats, weight loss, nausea, vomiting, diarrhea, constipation, diplopia, blurred vision, headaches, chest pain, palpitations, shortness of breath, breast pain, or difficulty ambulating.  The remainder of the ten-point ROS, including general, skin, lymph, H/N, cardiorespiratory, GI, , Neuro, Endocrine, and psychiatric is negative.       Objective:      Physical Exam    She is alert, oriented to time, place, person, pleasant, well       nourished, in no acute physical distress.                                      VITAL SIGNS:  Reviewed                                      HEENT: normal.  There are no nasal, oral, lip, gingival, auricular, lid,    or conjunctival lesions.  Mucosae are moist and pink, and there is no        thrush.  Pupils are equal, reactive to light and accommodation.              Extraocular muscle movements are intact.    Dentition is good.                                     NECK:  Supple without JVD, adenopathy, or thyromegaly.                       LUNGS:  Clear to auscultation without wheezing, rales, or rhonchi.           CARDIOVASCULAR:  Reveals an S1, S2, no murmurs, no rubs, no gallops.         ABDOMEN:  Soft, nontender, without organomegaly.  Bowel sounds are    present.   The abdominal incision has healed.                                                                 EXTREMITIES:  No cyanosis, clubbing, or edema.                               BREASTS:  She is status post bilateral mastectomies with free flap reconstructions.  The incisions have healed nicely.    There is a 2 cm hard nodule at the 9 o' clock position in the right reconstructed breast, representing fat necrosis.   This has been noted previously.                       LYMPHATIC:  There is no cervical, left axillary, or supraclavicular adenopathy.   SKIN:  Warm and moist, without petechiae, rashes, induration, or ecchymoses.  There is mild hyperpigmentation within the radiation field on the left chest wall.         NEUROLOGIC:  DTRs are 0-1+ bilaterally, symmetrical, motor function is 5/5,  and cranial nerves are  within normal limits.  Fundi are sharp without papilledema.     Assessment:       1. 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 Use of aromatase inhibitors     3.     PE, possibly related to the administration of AIs.   4       Abnormal clinical findings as described above  Plan:          I had a  long discussion with her;  She will remain on anastrazole for now, and she will complete her 5 years in December 2021.  I have again explained to her that given her N2 disease I will most likely recommend that she take anastrozole for a total of 10 years.  In regards to her PE, I recommended that she remain on rivaroxaban for the whole time that she is on AIs.     RTC 4 months.  Her DEXA scan will be repeated prior to her next visit.  Her questions were answered to her satisfaction.

## 2020-11-23 ENCOUNTER — OFFICE VISIT (OUTPATIENT)
Dept: HEMATOLOGY/ONCOLOGY | Facility: CLINIC | Age: 50
End: 2020-11-23
Payer: COMMERCIAL

## 2020-11-23 VITALS
TEMPERATURE: 98 F | BODY MASS INDEX: 40.49 KG/M2 | SYSTOLIC BLOOD PRESSURE: 123 MMHG | DIASTOLIC BLOOD PRESSURE: 83 MMHG | HEIGHT: 69 IN | WEIGHT: 273.38 LBS | OXYGEN SATURATION: 99 % | HEART RATE: 85 BPM

## 2020-11-23 DIAGNOSIS — C50.612 CARCINOMA OF AXILLARY TAIL OF LEFT BREAST IN FEMALE, ESTROGEN RECEPTOR POSITIVE: Primary | ICD-10-CM

## 2020-11-23 DIAGNOSIS — Z17.0 CARCINOMA OF AXILLARY TAIL OF LEFT BREAST IN FEMALE, ESTROGEN RECEPTOR POSITIVE: Primary | ICD-10-CM

## 2020-11-23 DIAGNOSIS — Z79.811 USE OF AROMATASE INHIBITORS: ICD-10-CM

## 2020-11-23 PROCEDURE — 99213 PR OFFICE/OUTPT VISIT, EST, LEVL III, 20-29 MIN: ICD-10-PCS | Mod: S$GLB,,, | Performed by: INTERNAL MEDICINE

## 2020-11-23 PROCEDURE — 99999 PR PBB SHADOW E&M-EST. PATIENT-LVL III: CPT | Mod: PBBFAC,,, | Performed by: INTERNAL MEDICINE

## 2020-11-23 PROCEDURE — 99213 OFFICE O/P EST LOW 20 MIN: CPT | Mod: S$GLB,,, | Performed by: INTERNAL MEDICINE

## 2020-11-23 PROCEDURE — 99999 PR PBB SHADOW E&M-EST. PATIENT-LVL III: ICD-10-PCS | Mod: PBBFAC,,, | Performed by: INTERNAL MEDICINE

## 2020-12-10 DIAGNOSIS — Z79.811 USE OF AROMATASE INHIBITORS: ICD-10-CM

## 2020-12-10 RX ORDER — ANASTROZOLE 1 MG/1
1 TABLET ORAL DAILY
Qty: 90 TABLET | Refills: 2 | Status: SHIPPED | OUTPATIENT
Start: 2020-12-10 | End: 2021-12-10

## 2021-03-22 ENCOUNTER — HOSPITAL ENCOUNTER (OUTPATIENT)
Dept: RADIOLOGY | Facility: CLINIC | Age: 51
Discharge: HOME | End: 2021-03-22
Attending: INTERNAL MEDICINE
Payer: COMMERCIAL

## 2021-03-22 ENCOUNTER — OFFICE VISIT (OUTPATIENT)
Dept: HEMATOLOGY/ONCOLOGY | Facility: CLINIC | Age: 51
End: 2021-03-22
Payer: MEDICARE

## 2021-03-22 VITALS
TEMPERATURE: 98 F | SYSTOLIC BLOOD PRESSURE: 132 MMHG | BODY MASS INDEX: 41.41 KG/M2 | HEIGHT: 69 IN | RESPIRATION RATE: 17 BRPM | DIASTOLIC BLOOD PRESSURE: 78 MMHG | WEIGHT: 279.56 LBS | HEART RATE: 118 BPM | OXYGEN SATURATION: 98 %

## 2021-03-22 DIAGNOSIS — Z79.811 USE OF AROMATASE INHIBITORS: ICD-10-CM

## 2021-03-22 DIAGNOSIS — C50.612 CARCINOMA OF AXILLARY TAIL OF LEFT BREAST IN FEMALE, ESTROGEN RECEPTOR POSITIVE: Primary | ICD-10-CM

## 2021-03-22 DIAGNOSIS — C50.612 CARCINOMA OF AXILLARY TAIL OF LEFT BREAST IN FEMALE, ESTROGEN RECEPTOR POSITIVE: ICD-10-CM

## 2021-03-22 DIAGNOSIS — Z17.0 CARCINOMA OF AXILLARY TAIL OF LEFT BREAST IN FEMALE, ESTROGEN RECEPTOR POSITIVE: ICD-10-CM

## 2021-03-22 DIAGNOSIS — Z17.0 CARCINOMA OF AXILLARY TAIL OF LEFT BREAST IN FEMALE, ESTROGEN RECEPTOR POSITIVE: Primary | ICD-10-CM

## 2021-03-22 PROCEDURE — 99999 PR PBB SHADOW E&M-EST. PATIENT-LVL IV: CPT | Mod: PBBFAC,,, | Performed by: INTERNAL MEDICINE

## 2021-03-22 PROCEDURE — 99214 OFFICE O/P EST MOD 30 MIN: CPT | Mod: S$PBB,,, | Performed by: INTERNAL MEDICINE

## 2021-03-22 PROCEDURE — 99214 PR OFFICE/OUTPT VISIT, EST, LEVL IV, 30-39 MIN: ICD-10-PCS | Mod: S$PBB,,, | Performed by: INTERNAL MEDICINE

## 2021-03-22 PROCEDURE — 77080 DEXA BONE DENSITY SPINE HIP: ICD-10-PCS | Mod: 26,,, | Performed by: INTERNAL MEDICINE

## 2021-03-22 PROCEDURE — 99214 OFFICE O/P EST MOD 30 MIN: CPT | Mod: PBBFAC,25 | Performed by: INTERNAL MEDICINE

## 2021-03-22 PROCEDURE — 77080 DXA BONE DENSITY AXIAL: CPT | Mod: TC

## 2021-03-22 PROCEDURE — 77080 DXA BONE DENSITY AXIAL: CPT | Mod: 26,,, | Performed by: INTERNAL MEDICINE

## 2021-03-22 PROCEDURE — 99999 PR PBB SHADOW E&M-EST. PATIENT-LVL IV: ICD-10-PCS | Mod: PBBFAC,,, | Performed by: INTERNAL MEDICINE

## 2021-03-22 RX ORDER — GLIMEPIRIDE 2 MG/1
2 TABLET ORAL 2 TIMES DAILY
COMMUNITY
Start: 2021-03-02 | End: 2023-03-22

## 2021-03-22 RX ORDER — DULAGLUTIDE 1.5 MG/.5ML
INJECTION, SOLUTION SUBCUTANEOUS
COMMUNITY
Start: 2021-02-15 | End: 2023-03-22

## 2021-03-22 RX ORDER — ROSUVASTATIN CALCIUM 20 MG/1
20 TABLET, COATED ORAL NIGHTLY
COMMUNITY
Start: 2021-02-15 | End: 2023-03-22

## 2021-10-07 ENCOUNTER — IMMUNIZATION (OUTPATIENT)
Dept: PRIMARY CARE CLINIC | Facility: CLINIC | Age: 51
End: 2021-10-07
Payer: MEDICARE

## 2021-10-07 DIAGNOSIS — Z23 NEED FOR VACCINATION: Primary | ICD-10-CM

## 2021-10-07 PROCEDURE — 0003A COVID-19, MRNA, LNP-S, PF, 30 MCG/0.3 ML DOSE VACCINE: CPT | Mod: CV19,PBBFAC | Performed by: INTERNAL MEDICINE

## 2021-10-07 PROCEDURE — 91300 COVID-19, MRNA, LNP-S, PF, 30 MCG/0.3 ML DOSE VACCINE: CPT | Mod: PBBFAC | Performed by: INTERNAL MEDICINE

## 2021-11-11 ENCOUNTER — OFFICE VISIT (OUTPATIENT)
Dept: HEMATOLOGY/ONCOLOGY | Facility: CLINIC | Age: 51
End: 2021-11-11
Payer: MEDICARE

## 2021-11-11 VITALS
BODY MASS INDEX: 40.75 KG/M2 | OXYGEN SATURATION: 97 % | DIASTOLIC BLOOD PRESSURE: 80 MMHG | SYSTOLIC BLOOD PRESSURE: 124 MMHG | HEIGHT: 69 IN | WEIGHT: 275.13 LBS | HEART RATE: 80 BPM | TEMPERATURE: 99 F | RESPIRATION RATE: 18 BRPM

## 2021-11-11 DIAGNOSIS — Z17.0 CARCINOMA OF AXILLARY TAIL OF LEFT BREAST IN FEMALE, ESTROGEN RECEPTOR POSITIVE: Primary | ICD-10-CM

## 2021-11-11 DIAGNOSIS — Z79.811 USE OF AROMATASE INHIBITORS: ICD-10-CM

## 2021-11-11 DIAGNOSIS — C50.612 CARCINOMA OF AXILLARY TAIL OF LEFT BREAST IN FEMALE, ESTROGEN RECEPTOR POSITIVE: Primary | ICD-10-CM

## 2021-11-11 PROCEDURE — 99214 OFFICE O/P EST MOD 30 MIN: CPT | Mod: PBBFAC | Performed by: INTERNAL MEDICINE

## 2021-11-11 PROCEDURE — 99999 PR PBB SHADOW E&M-EST. PATIENT-LVL IV: ICD-10-PCS | Mod: PBBFAC,,, | Performed by: INTERNAL MEDICINE

## 2021-11-11 PROCEDURE — 99214 PR OFFICE/OUTPT VISIT, EST, LEVL IV, 30-39 MIN: ICD-10-PCS | Mod: S$PBB,,, | Performed by: INTERNAL MEDICINE

## 2021-11-11 PROCEDURE — 99999 PR PBB SHADOW E&M-EST. PATIENT-LVL IV: CPT | Mod: PBBFAC,,, | Performed by: INTERNAL MEDICINE

## 2021-11-11 PROCEDURE — 99214 OFFICE O/P EST MOD 30 MIN: CPT | Mod: S$PBB,,, | Performed by: INTERNAL MEDICINE

## 2022-02-22 DIAGNOSIS — D84.9 IMMUNOSUPPRESSED STATUS: ICD-10-CM

## 2022-05-31 ENCOUNTER — OFFICE VISIT (OUTPATIENT)
Dept: HEMATOLOGY/ONCOLOGY | Facility: CLINIC | Age: 52
End: 2022-05-31
Payer: MEDICARE

## 2022-05-31 VITALS
BODY MASS INDEX: 39.79 KG/M2 | HEART RATE: 87 BPM | WEIGHT: 268.63 LBS | RESPIRATION RATE: 18 BRPM | HEIGHT: 69 IN | SYSTOLIC BLOOD PRESSURE: 129 MMHG | OXYGEN SATURATION: 98 % | DIASTOLIC BLOOD PRESSURE: 79 MMHG

## 2022-05-31 DIAGNOSIS — Z17.0 CARCINOMA OF AXILLARY TAIL OF LEFT BREAST IN FEMALE, ESTROGEN RECEPTOR POSITIVE: Primary | ICD-10-CM

## 2022-05-31 DIAGNOSIS — Z79.811 USE OF AROMATASE INHIBITORS: ICD-10-CM

## 2022-05-31 DIAGNOSIS — C50.612 CARCINOMA OF AXILLARY TAIL OF LEFT BREAST IN FEMALE, ESTROGEN RECEPTOR POSITIVE: Primary | ICD-10-CM

## 2022-05-31 PROCEDURE — 99214 OFFICE O/P EST MOD 30 MIN: CPT | Mod: PBBFAC | Performed by: INTERNAL MEDICINE

## 2022-05-31 PROCEDURE — 99214 OFFICE O/P EST MOD 30 MIN: CPT | Mod: S$GLB,,, | Performed by: INTERNAL MEDICINE

## 2022-05-31 PROCEDURE — 99999 PR PBB SHADOW E&M-EST. PATIENT-LVL IV: CPT | Mod: PBBFAC,,, | Performed by: INTERNAL MEDICINE

## 2022-05-31 PROCEDURE — 99214 PR OFFICE/OUTPT VISIT, EST, LEVL IV, 30-39 MIN: ICD-10-PCS | Mod: S$GLB,,, | Performed by: INTERNAL MEDICINE

## 2022-05-31 PROCEDURE — 99999 PR PBB SHADOW E&M-EST. PATIENT-LVL IV: ICD-10-PCS | Mod: PBBFAC,,, | Performed by: INTERNAL MEDICINE

## 2022-08-18 ENCOUNTER — IMMUNIZATION (OUTPATIENT)
Dept: FAMILY MEDICINE | Facility: CLINIC | Age: 52
End: 2022-08-18
Payer: MEDICARE

## 2022-08-18 DIAGNOSIS — Z23 NEED FOR VACCINATION: Primary | ICD-10-CM

## 2022-08-18 PROCEDURE — 99999 COVID-19, MRNA, LNP-S, PF, 30 MCG/0.3 ML DOSE VACCINE (PFIZER): CPT | Mod: PBBFAC,,,

## 2022-08-18 PROCEDURE — 91305 COVID-19, MRNA, LNP-S, PF, 30 MCG/0.3 ML DOSE VACCINE (PFIZER): CPT | Mod: PBBFAC

## 2022-08-18 PROCEDURE — 99999 COVID-19, MRNA, LNP-S, PF, 30 MCG/0.3 ML DOSE VACCINE (PFIZER): ICD-10-PCS | Mod: PBBFAC,,,

## 2022-11-16 ENCOUNTER — OFFICE VISIT (OUTPATIENT)
Dept: HEMATOLOGY/ONCOLOGY | Facility: CLINIC | Age: 52
End: 2022-11-16
Payer: COMMERCIAL

## 2022-11-16 VITALS
HEIGHT: 69 IN | DIASTOLIC BLOOD PRESSURE: 83 MMHG | OXYGEN SATURATION: 96 % | TEMPERATURE: 98 F | BODY MASS INDEX: 38.82 KG/M2 | WEIGHT: 262.13 LBS | RESPIRATION RATE: 18 BRPM | SYSTOLIC BLOOD PRESSURE: 146 MMHG | HEART RATE: 92 BPM

## 2022-11-16 DIAGNOSIS — Z17.0 CARCINOMA OF AXILLARY TAIL OF LEFT BREAST IN FEMALE, ESTROGEN RECEPTOR POSITIVE: Primary | ICD-10-CM

## 2022-11-16 DIAGNOSIS — C50.612 CARCINOMA OF AXILLARY TAIL OF LEFT BREAST IN FEMALE, ESTROGEN RECEPTOR POSITIVE: Primary | ICD-10-CM

## 2022-11-16 DIAGNOSIS — M81.0 OSTEOPOROSIS, UNSPECIFIED OSTEOPOROSIS TYPE, UNSPECIFIED PATHOLOGICAL FRACTURE PRESENCE: ICD-10-CM

## 2022-11-16 PROCEDURE — 99215 OFFICE O/P EST HI 40 MIN: CPT | Mod: PBBFAC | Performed by: INTERNAL MEDICINE

## 2022-11-16 PROCEDURE — 99999 PR PBB SHADOW E&M-EST. PATIENT-LVL V: ICD-10-PCS | Mod: PBBFAC,,, | Performed by: INTERNAL MEDICINE

## 2022-11-16 PROCEDURE — 99999 PR PBB SHADOW E&M-EST. PATIENT-LVL V: CPT | Mod: PBBFAC,,, | Performed by: INTERNAL MEDICINE

## 2022-11-16 PROCEDURE — 99214 OFFICE O/P EST MOD 30 MIN: CPT | Mod: S$GLB,,, | Performed by: INTERNAL MEDICINE

## 2022-11-16 PROCEDURE — 99214 PR OFFICE/OUTPT VISIT, EST, LEVL IV, 30-39 MIN: ICD-10-PCS | Mod: S$GLB,,, | Performed by: INTERNAL MEDICINE

## 2022-11-16 RX ORDER — INSULIN DEGLUDEC 100 U/ML
INJECTION, SOLUTION SUBCUTANEOUS
COMMUNITY

## 2022-11-16 NOTE — PROGRESS NOTES
Subjective:       Patient ID: Allegra Reese is a 52 y.o. female.    Chief Complaint: Carcinoma of axillary tail of left breast in female, estrog    HPI     Mrs. Reese returns today for follow up.  I had last seen her late May 2022.  In late January 2017 she underwent laparoscopic oophorectomies.  She remains on AIs.   Briefly, she is a 52-year-old  female from [place] who was diagnosed with breast cancer and on 04/27/2016 underwent bilateral mastectomies with reconstruction.   There was no invasive cancer or DCIS in the right breast; in the left breast she had three areas of an infiltrating ductal carcinoma measuring 11 mm, 5 mm, and 3 mm respectively.  One sentinel lymph node was positive and a full axillary dissection was performed with a total of five lymph nodes being positive.  She was tested and found to be negative for BRCA mutations.  Of note, her cancer was strongly ER and [place] positive and HER 2 megative  She started adjuvant chemotherapy on June 24th 2016, and completed 4 cycles of AC and four cycles of paclitaxel.   She subsequently received XRT and was started on zoladex plus anastrazole.    Two weeks after undergoing reconstruction in 2017 she had presented to the ED with SOB and was found to have PEs.  She was started on rivaroxaban, and is currently on 20 mg QHS.   Her DEXA scan from earlier today shows mild osteopenia of the L-spine.    Review of Systems    Overall today she feels OK.  She complains of intermittent low back pain triggered by exercise.   Her  ECOG PS remains 1.  She denies any easy bruising, fevers, chills, night  sweats, weight loss, nausea, vomiting, diarrhea, constipation, diplopia, blurred vision, headaches, chest pain, palpitations, shortness of breath, breast pain, or difficulty ambulating.  The remainder of the ten-point ROS, including general, skin, lymph, H/N, cardiorespiratory, GI, , Neuro, Endocrine, and psychiatric is negative.       Objective:       Physical Exam    She is alert, oriented to time, place, person, pleasant, well      nourished, in no acute physical distress.                                   VITAL SIGNS:  Reviewed                                      HEENT: normal.  There are no nasal, oral, lip, gingival, auricular, lid,    or conjunctival lesions.  Mucosae are moist and pink, and there is no        thrush.  Pupils are equal, reactive to light and accommodation.              Extraocular muscle movements are intact.    Dentition is good.                                     NECK:  Supple without JVD, adenopathy, or thyromegaly.                       LUNGS:  Clear to auscultation without wheezing, rales, or rhonchi.           CARDIOVASCULAR:  Reveals an S1, S2, no murmurs, no rubs, no gallops.         ABDOMEN:  Soft, nontender, without organomegaly.  Bowel sounds are    present.   The abdominal incision has healed.                                                                 EXTREMITIES:  No cyanosis, clubbing, or edema.                               BREASTS:  She is status post bilateral mastectomies with free flap reconstructions.  The incisions have healed nicely.    There is an area of fat necrosis at the 9 o' clock position in the right reconstructed breast.   This has been noted previously.                       LYMPHATIC:  There is no cervical, left axillary, or supraclavicular adenopathy.   SKIN:  Warm and moist, without petechiae, rashes, induration, or ecchymoses.  There is mild hyperpigmentation within the radiation field on the left chest wall.         NEUROLOGIC:  DTRs are 0-1+ bilaterally, symmetrical, motor function is 5/5,  and cranial nerves are  within normal limits.  Fundi are sharp without papilledema.     Assessment:       1. 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     PE, possibly related to the administration of AIs.   4.      Musculoskeletal complaints secondary to  aromatase inhibitors.  Plan:          I had a long discussion with her;  She will remain on anastrazole for now, and she will complete her 7 years in December 2023.    In regards to her PE, I would prefer that she remain on rivaroxaban for the whole time that she is on AIs.  Finally, her bone scan density scan will be repeated prior to her next visit in 6 months.  RTC 6 months.  Her questions were answered to her satisfaction.    Route Chart for Scheduling    Med Onc Chart Routing      Follow up with physician 6 months. Return in 6 months.  No labs.   Follow up with [facility]    Infusion scheduling note    Injection scheduling note    Labs    Imaging    Pharmacy appointment    Other referrals

## 2023-01-16 ENCOUNTER — PATIENT MESSAGE (OUTPATIENT)
Dept: HEMATOLOGY/ONCOLOGY | Facility: CLINIC | Age: 53
End: 2023-01-16
Payer: MEDICARE

## 2023-01-17 ENCOUNTER — PATIENT MESSAGE (OUTPATIENT)
Dept: HEMATOLOGY/ONCOLOGY | Facility: CLINIC | Age: 53
End: 2023-01-17
Payer: MEDICARE

## 2023-01-18 ENCOUNTER — OFFICE VISIT (OUTPATIENT)
Dept: HEMATOLOGY/ONCOLOGY | Facility: CLINIC | Age: 53
End: 2023-01-18
Payer: COMMERCIAL

## 2023-01-18 VITALS
SYSTOLIC BLOOD PRESSURE: 127 MMHG | HEART RATE: 88 BPM | RESPIRATION RATE: 18 BRPM | OXYGEN SATURATION: 98 % | DIASTOLIC BLOOD PRESSURE: 78 MMHG | WEIGHT: 253.75 LBS | TEMPERATURE: 98 F | BODY MASS INDEX: 37.47 KG/M2

## 2023-01-18 DIAGNOSIS — C50.612 CARCINOMA OF AXILLARY TAIL OF LEFT BREAST IN FEMALE, ESTROGEN RECEPTOR POSITIVE: Primary | ICD-10-CM

## 2023-01-18 DIAGNOSIS — Z17.0 CARCINOMA OF AXILLARY TAIL OF LEFT BREAST IN FEMALE, ESTROGEN RECEPTOR POSITIVE: Primary | ICD-10-CM

## 2023-01-18 DIAGNOSIS — Z79.811 USE OF AROMATASE INHIBITORS: ICD-10-CM

## 2023-01-18 PROCEDURE — 99999 PR PBB SHADOW E&M-EST. PATIENT-LVL IV: ICD-10-PCS | Mod: PBBFAC,,, | Performed by: INTERNAL MEDICINE

## 2023-01-18 PROCEDURE — 99999 PR PBB SHADOW E&M-EST. PATIENT-LVL IV: CPT | Mod: PBBFAC,,, | Performed by: INTERNAL MEDICINE

## 2023-01-18 PROCEDURE — 99214 PR OFFICE/OUTPT VISIT, EST, LEVL IV, 30-39 MIN: ICD-10-PCS | Mod: S$GLB,,, | Performed by: INTERNAL MEDICINE

## 2023-01-18 PROCEDURE — 99214 OFFICE O/P EST MOD 30 MIN: CPT | Mod: PBBFAC | Performed by: INTERNAL MEDICINE

## 2023-01-18 PROCEDURE — 99214 OFFICE O/P EST MOD 30 MIN: CPT | Mod: S$GLB,,, | Performed by: INTERNAL MEDICINE

## 2023-01-18 NOTE — PROGRESS NOTES
Subjective:       Patient ID: Allegra Reese is a 52 y.o. female.    Chief Complaint: No chief complaint on file.      HPI     Mrs. Reese returns today for follow up.  I had last seen her in mid November 2022 and had requested that she return to see me in 6 months, however, she had called 2 days ago and reported that she had a lump in her right axilla hence the visit here today.    .   Briefly, she is a 52-year-old  female from [place] who was diagnosed with breast cancer and on 04/27/2016 underwent bilateral mastectomies with reconstruction.   There was no invasive cancer or DCIS in the right breast; in the left breast she had three areas of an infiltrating ductal carcinoma measuring 11 mm, 5 mm, and 3 mm respectively.  One sentinel lymph node was positive and a full axillary dissection was performed with a total of five lymph nodes being positive.  She was tested and found to be negative for BRCA mutations.  Of note, her cancer was strongly ER and [place] positive and HER 2 megative  She started adjuvant chemotherapy on June 24th 2016, and completed 4 cycles of AC and four cycles of paclitaxel.   She subsequently received XRT and was started on zoladex plus anastrazole.  In late January 2017 she underwent laparoscopic oophorectomies and Zoladex was discontinued.  Two weeks after undergoing reconstruction in 2017 she had presented to the ED with SOB and was found to have PEs.  She was started on rivaroxaban, and is currently on 20 mg QHS.   Her DEXA scan from earlier today shows mild osteopenia of the L-spine.    Review of Systems    Overall today she feels OK.  She appears concerned with a small sebaceous cyst that she has developed in her right axilla.  She states that it rubs on her bra and it is painful.  She has no other complaints..   Her  ECOG PS remains 1.  She denies any easy bruising, fevers, chills, night  sweats, weight loss, nausea, vomiting, diarrhea, constipation, diplopia, blurred  vision, headaches, chest pain, palpitations, shortness of breath, breast pain, or difficulty ambulating.  The remainder of the ten-point ROS, including general, skin, lymph, H/N, cardiorespiratory, GI, , Neuro, Endocrine, and psychiatric is negative.       Objective:      Physical Exam    She is alert, oriented to time, place, person, pleasant, well      nourished, in no acute physical distress.  She is accompanied by her mother.                                  VITAL SIGNS:  Reviewed                                      HEENT: normal.  There are no nasal, oral, lip, gingival, auricular, lid,    or conjunctival lesions.  Mucosae are moist and pink, and there is no        thrush.  Pupils are equal, reactive to light and accommodation.              Extraocular muscle movements are intact.    Dentition is good.                                     NECK:  Supple without JVD, adenopathy, or thyromegaly.                       LUNGS:  Clear to auscultation without wheezing, rales, or rhonchi.           CARDIOVASCULAR:  Reveals an S1, S2, no murmurs, no rubs, no gallops.         ABDOMEN:  Soft, nontender, without organomegaly.  Bowel sounds are    present.   The abdominal incision has healed.                                                                 EXTREMITIES:  No cyanosis, clubbing, or edema.                               BREASTS:  She is status post bilateral mastectomies with free flap reconstructions.  The incisions have healed nicely.    There is an area of fat necrosis at the 9 o' clock position in the right reconstructed breast.   This has been noted previously.                       LYMPHATIC:  There is no cervical, left axillary, or supraclavicular adenopathy.  There is a small superficial lump on the skin in the right axilla that most likely represents a sebaceous cyst.  It is slightly tender to palpation.  SKIN:  Warm and moist, without petechiae, rashes, induration, or ecchymoses.  There is mild  hyperpigmentation within the radiation field on the left chest wall.         NEUROLOGIC:  DTRs are 0-1+ bilaterally, symmetrical, motor function is 5/5,  and cranial nerves are  within normal limits.  Fundi are sharp without papilledema.     Assessment:       1. 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 Use of aromatase inhibitors     3.     PE, possibly related to the administration of AIs.   4.      Musculoskeletal complaints secondary to aromatase inhibitors.  5.      Sebaceous cyst, right axilla.  Plan:          I had a long discussion with her;  She will remain on anastrazole for now, and she will complete her 7 years in December 2023.    In regards to her PE, I would prefer that she remain on rivaroxaban for the whole time that she is on AIs.  In regards to the small sebaceous cyst in the axilla, since it is painful and bothersome to her, I will ask Dr. Grant to evaluate for possible resection  I will see her again as scheduled in March.  Finally, her bone scan density scan will be repeated prior to her next visit in March 2023  Her questions were answered to her satisfaction.    Route Chart for Scheduling    Med Onc Chart Routing      Follow up with physician Other. Keep her appointment for March or early April.  She needs to see Dr. Grant   Follow up with [facility]    Infusion scheduling note    Injection scheduling note    Labs    Imaging    Pharmacy appointment    Other referrals

## 2023-01-19 ENCOUNTER — TELEPHONE (OUTPATIENT)
Dept: SURGERY | Facility: CLINIC | Age: 53
End: 2023-01-19
Payer: MEDICARE

## 2023-02-06 ENCOUNTER — TELEPHONE (OUTPATIENT)
Dept: HEMATOLOGY/ONCOLOGY | Facility: CLINIC | Age: 53
End: 2023-02-06
Payer: MEDICARE

## 2023-02-06 DIAGNOSIS — Z79.811 PROPHYLACTIC USE OF ANASTROZOLE (ARIMIDEX): Primary | ICD-10-CM

## 2023-02-06 RX ORDER — ANASTROZOLE 1 MG/1
1 TABLET ORAL DAILY
Qty: 30 TABLET | Refills: 2 | Status: SHIPPED | OUTPATIENT
Start: 2023-02-06 | End: 2023-06-09 | Stop reason: SDUPTHER

## 2023-02-13 ENCOUNTER — PATIENT MESSAGE (OUTPATIENT)
Dept: SURGERY | Facility: CLINIC | Age: 53
End: 2023-02-13
Payer: MEDICARE

## 2023-02-20 ENCOUNTER — TELEPHONE (OUTPATIENT)
Dept: SURGERY | Facility: CLINIC | Age: 53
End: 2023-02-20
Payer: MEDICARE

## 2023-02-20 NOTE — TELEPHONE ENCOUNTER
Spoke to patient who stated that the cyst is inflamed.  Recommended to pt to apply warm compresses.  If is opens and drain that is good.  Made appt for pt to see June Ross NP Wednesday.  Made appt for to see Dr. Grant to talk about removal on 3/21. Pt verbalized understanding and agreeable to time/date of appts.

## 2023-02-20 NOTE — TELEPHONE ENCOUNTER
----- Message from Laine Jacome sent at 2/20/2023  7:03 AM CST -----  Regarding: scheduling  Good Morning All,     The pt is stating  is referring her to be seen by Flori Grant for a cyst underneath her breast. Please contact the pt to schedule an appt. Please advise   Thanks   L~        Happy Monday and Happy Mardi Gras       Pt message     Message    Appointment Request From: Allegra Reese    With Provider: Flori Grant    Preferred Date Range: 2/20/2023 - 2/24/2023    Preferred Times: Any Time    Reason for visit: Bump under arm    Comments:  Breast cancer in 2016. Cyst under right arm. Dr. Lloyd wants me to see her. Cyst tripled in size in a week.

## 2023-02-22 ENCOUNTER — OFFICE VISIT (OUTPATIENT)
Dept: SURGERY | Facility: CLINIC | Age: 53
End: 2023-02-22
Payer: COMMERCIAL

## 2023-02-22 VITALS
WEIGHT: 253 LBS | TEMPERATURE: 98 F | BODY MASS INDEX: 37.47 KG/M2 | SYSTOLIC BLOOD PRESSURE: 118 MMHG | OXYGEN SATURATION: 97 % | HEART RATE: 84 BPM | HEIGHT: 69 IN | DIASTOLIC BLOOD PRESSURE: 77 MMHG

## 2023-02-22 DIAGNOSIS — L02.91 ABSCESS: Primary | ICD-10-CM

## 2023-02-22 PROCEDURE — 3078F PR MOST RECENT DIASTOLIC BLOOD PRESSURE < 80 MM HG: ICD-10-PCS | Mod: CPTII,S$GLB,, | Performed by: NURSE PRACTITIONER

## 2023-02-22 PROCEDURE — 99204 PR OFFICE/OUTPT VISIT, NEW, LEVL IV, 45-59 MIN: ICD-10-PCS | Mod: 25,S$GLB,, | Performed by: NURSE PRACTITIONER

## 2023-02-22 PROCEDURE — 3008F BODY MASS INDEX DOCD: CPT | Mod: CPTII,S$GLB,, | Performed by: NURSE PRACTITIONER

## 2023-02-22 PROCEDURE — 3074F SYST BP LT 130 MM HG: CPT | Mod: CPTII,S$GLB,, | Performed by: NURSE PRACTITIONER

## 2023-02-22 PROCEDURE — 99204 OFFICE O/P NEW MOD 45 MIN: CPT | Mod: 25,S$GLB,, | Performed by: NURSE PRACTITIONER

## 2023-02-22 PROCEDURE — 3078F DIAST BP <80 MM HG: CPT | Mod: CPTII,S$GLB,, | Performed by: NURSE PRACTITIONER

## 2023-02-22 PROCEDURE — 10021 PR FINE NEEDLE ASP BIOPSY, W/O IMAGING GUIDANCE, 1ST LESION: ICD-10-PCS | Mod: S$GLB,,, | Performed by: NURSE PRACTITIONER

## 2023-02-22 PROCEDURE — 99999 PR PBB SHADOW E&M-EST. PATIENT-LVL V: CPT | Mod: PBBFAC,,, | Performed by: NURSE PRACTITIONER

## 2023-02-22 PROCEDURE — 87070 CULTURE OTHR SPECIMN AEROBIC: CPT | Performed by: NURSE PRACTITIONER

## 2023-02-22 PROCEDURE — 3074F PR MOST RECENT SYSTOLIC BLOOD PRESSURE < 130 MM HG: ICD-10-PCS | Mod: CPTII,S$GLB,, | Performed by: NURSE PRACTITIONER

## 2023-02-22 PROCEDURE — 3008F PR BODY MASS INDEX (BMI) DOCUMENTED: ICD-10-PCS | Mod: CPTII,S$GLB,, | Performed by: NURSE PRACTITIONER

## 2023-02-22 PROCEDURE — 99999 PR PBB SHADOW E&M-EST. PATIENT-LVL V: ICD-10-PCS | Mod: PBBFAC,,, | Performed by: NURSE PRACTITIONER

## 2023-02-22 PROCEDURE — 87075 CULTR BACTERIA EXCEPT BLOOD: CPT | Performed by: NURSE PRACTITIONER

## 2023-02-22 PROCEDURE — 10021 FNA BX W/O IMG GDN 1ST LES: CPT | Mod: S$GLB,,, | Performed by: NURSE PRACTITIONER

## 2023-02-22 PROCEDURE — 87076 CULTURE ANAEROBE IDENT EACH: CPT | Performed by: NURSE PRACTITIONER

## 2023-02-22 RX ORDER — SULFAMETHOXAZOLE AND TRIMETHOPRIM 800; 160 MG/1; MG/1
1 TABLET ORAL 2 TIMES DAILY
Qty: 14 TABLET | Refills: 0 | Status: SHIPPED | OUTPATIENT
Start: 2023-02-22 | End: 2023-03-01

## 2023-02-22 NOTE — PROGRESS NOTES
[facility]  Department of Surgery      REFERRING PROVIDER: Conner Lucas MD  [address] [place] [address]    Chief Complaint: Breast Cyst (New Patient Small Right Breast Cyst .)      Subjective:      Patient ID: Allegra Reese is a 53 y.o. female who presents with right breast cyst. Patient has a history of multifocal hormone positive left breast cancer. She underwent bilateral mastectomy and left ALND on 4/27/2016. Final pathology revealed IDC (11 mm, 5 mm, and 3 mm) and 5 lymph nodes positive for metastatic carcinoma. She underwent adjuvant chemotherapy with AC x4 and Paclitaxel x4 on 6/24/2016. Does report undergoing adjuvant XRT of the left chest. She was then started on Zoladex and Anastrozole. Her post-operative course was complicated by PEs  in 2017.     Patient reports new onset of redness and tenderness to right axilla area. She was seen by Dr. Lucas on 1/18/2023 and a sebaceous cyst was noted on exam. Since then the patient reports the cyst has tripled in size with worsening symptoms. Denies drainage from the area.       Past Medical History:   Diagnosis Date    Anxiety     Breast cancer 2016    left    Breast cyst     Cancer     Depression     History of psychiatric hospitalization     [facility] [place] due to suicidality    Hx of psychiatric care     Celexa, Wellbutrin, Topamax    Hypertension     Psychiatric exam requested by authority     Psychiatric problem     PTSD (post-traumatic stress disorder)     Sleep difficulties     Therapy      Past Surgical History:   Procedure Laterality Date    BREAST BIOPSY Left 2016    BREAST RECONSTRUCTION Bilateral 2016    supriya flap reconstruction bilat    CYST REMOVAL Left     breast    HYSTERECTOMY      MASTECTOMY Bilateral 2016    OOPHORECTOMY      SHOULDER SURGERY Right     TOTAL REDUCTION MAMMOPLASTY Bilateral      Current Outpatient Medications on File Prior to Visit   Medication Sig Dispense Refill    canagliflozin  "(INVOKANA ORAL) Invokana      insulin degludec (TRESIBA FLEXTOUCH U-100) 100 unit/mL (3 mL) insulin pen Tresiba FlexTouch U-100      metoprolol succinate (TOPROL-XL) 25 MG 24 hr tablet TK 1 T PO QD  6    tirzepatide (MOUNJARO SUBQ) Mounjaro      XARELTO 20 mg Tab Take 20 mg by mouth once daily.  12    anastrozole (ARIMIDEX) 1 mg Tab Take 1 tablet (1 mg total) by mouth once daily. 30 tablet 2    metFORMIN (GLUCOPHAGE) 1000 MG tablet Take 1 tablet (1,000 mg total) by mouth 2 (two) times daily. 60 tablet 1     No current facility-administered medications on file prior to visit.     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 Negative for chills and fatigue.   Eyes:  Negative for visual disturbance.   Respiratory:  Negative for cough, shortness of breath and wheezing.    Cardiovascular:  Negative for chest pain and palpitations.   Skin:  Positive for color change. Negative for pallor, rash and wound.   Neurological:  Negative for headaches.   Objective:   /77 (BP Location: Right arm, Patient Position: Sitting, BP Method: Large (Automatic))   Pulse 84   Temp 97.8 °F (36.6 °C) (Oral)   Ht 5' 9" (1.753 m)  "  Wt 114.8 kg (253 lb)   SpO2 97%   BMI 37.36 kg/m²     Physical Exam   Vitals reviewed.  Constitutional: She is oriented to person, place, and time.   Eyes: Right eye exhibits no discharge. Left eye exhibits no discharge.   Cardiovascular:  Normal pulses.            Pulmonary/Chest: Effort normal. No respiratory distress. She has no wheezes. Right breast exhibits mass, skin change and tenderness. There is breast swelling.       Abdominal: Normal appearance.   Genitourinary: There is breast swelling.   Musculoskeletal: Normal range of motion. Lymphadenopathy:      Cervical: No cervical adenopathy.     Neurological: She is alert and oriented to person, place, and time.   Skin: Skin is warm and dry. No erythema. No pallor.         Fine Needle Aspiration Procedure Note    Pre-operative Diagnosis: cyst    Post-operative Diagnosis: same    Location: right axilla    Anesthesia: 1% plain lidocaine    Procedure Details   The Procedure, risks and complications have been discussed in detail (including, but not limited to pain, infection, bleeding) with the patient, and the patient has signed consent to have the surgery completed.    The skin was sterilely prepped and draped over the affected area in the usual fashion.  Fine Needle Aspiration was performed with a 16 guage needle using ultrasound guidance.  Contents of Aspiration: 5 ml of purulent fluid.  Size of mass after cyst aspiration: gone  Cultures sent   EBL: none  Sterile dressing placed.  May place ice pack over affected area during the first 24 hours.    Condition:  Stable    Complications:  None.    Radiology review: Images personally reviewed by me in the clinic.     Assessment:       1. Abscess        Plan:     We discussed the options for management of a breast abscess. These include antibiotics and drainage.  Drainage options include aspiration vs. incision and drainage.     We proceeded with needle aspiration.  Risks and benefits explained.  All questions  were answered. Cultures sent. Will start on abx    Patient will return to clinic in 1 week for wound check.  Instructions for wound care given to the patient.    Patient is interested in cyst removal once acute infection has resolved. Will set up with Dr. Grant for discussion.     The patient is in agreement with the plan. Questions were encouraged and answered to patient's satisfaction. Allegra will call our office with any questions or concerns.

## 2023-02-22 NOTE — LETTER
February 22, 2023    Allegra Reese  [address] [place] [id number]         [facility]  [facility] [address]  Phone: [phone]  Fax: [phone] February 22, 2023     Patient: Allegra Reese   YOB: 1970   Date of Visit: 2/22/2023       To Whom It May Concern:    Please excuse Allegra Reese from work on 2/22/2023 as she was under my care in clinic. It is my medical opinion that Allegra Reese may return to work on [phone].    If you have any questions or concerns, please don't hesitate to call.    Sincerely,        June Ross, NP

## 2023-02-24 LAB — BACTERIA SPEC AEROBE CULT: ABNORMAL

## 2023-02-27 ENCOUNTER — PATIENT MESSAGE (OUTPATIENT)
Dept: SURGERY | Facility: CLINIC | Age: 53
End: 2023-02-27

## 2023-02-27 LAB — BACTERIA SPEC ANAEROBE CULT: ABNORMAL

## 2023-03-21 ENCOUNTER — OFFICE VISIT (OUTPATIENT)
Dept: SURGERY | Facility: CLINIC | Age: 53
End: 2023-03-21
Payer: COMMERCIAL

## 2023-03-21 ENCOUNTER — HOSPITAL ENCOUNTER (OUTPATIENT)
Dept: RADIOLOGY | Facility: HOSPITAL | Age: 53
Discharge: HOME | End: 2023-03-21
Attending: INTERNAL MEDICINE
Payer: COMMERCIAL

## 2023-03-21 VITALS
HEART RATE: 84 BPM | HEIGHT: 69 IN | DIASTOLIC BLOOD PRESSURE: 80 MMHG | SYSTOLIC BLOOD PRESSURE: 132 MMHG | WEIGHT: 244.69 LBS | BODY MASS INDEX: 36.24 KG/M2

## 2023-03-21 DIAGNOSIS — C50.612 CARCINOMA OF AXILLARY TAIL OF LEFT BREAST IN FEMALE, ESTROGEN RECEPTOR POSITIVE: ICD-10-CM

## 2023-03-21 DIAGNOSIS — M81.0 OSTEOPOROSIS, UNSPECIFIED OSTEOPOROSIS TYPE, UNSPECIFIED PATHOLOGICAL FRACTURE PRESENCE: ICD-10-CM

## 2023-03-21 DIAGNOSIS — L72.3 SEBACEOUS CYST OF AXILLA: Primary | ICD-10-CM

## 2023-03-21 DIAGNOSIS — Z17.0 CARCINOMA OF AXILLARY TAIL OF LEFT BREAST IN FEMALE, ESTROGEN RECEPTOR POSITIVE: ICD-10-CM

## 2023-03-21 PROCEDURE — 3075F SYST BP GE 130 - 139MM HG: CPT | Mod: CPTII,S$GLB,, | Performed by: SURGERY

## 2023-03-21 PROCEDURE — 77080 DXA BONE DENSITY AXIAL: CPT | Mod: 26,,, | Performed by: RADIOLOGY

## 2023-03-21 PROCEDURE — 1160F PR REVIEW ALL MEDS BY PRESCRIBER/CLIN PHARMACIST DOCUMENTED: ICD-10-PCS | Mod: CPTII,S$GLB,, | Performed by: SURGERY

## 2023-03-21 PROCEDURE — 1160F RVW MEDS BY RX/DR IN RCRD: CPT | Mod: CPTII,S$GLB,, | Performed by: SURGERY

## 2023-03-21 PROCEDURE — 99999 PR PBB SHADOW E&M-EST. PATIENT-LVL III: ICD-10-PCS | Mod: PBBFAC,,, | Performed by: SURGERY

## 2023-03-21 PROCEDURE — 99213 PR OFFICE/OUTPT VISIT, EST, LEVL III, 20-29 MIN: ICD-10-PCS | Mod: S$GLB,,, | Performed by: SURGERY

## 2023-03-21 PROCEDURE — 99213 OFFICE O/P EST LOW 20 MIN: CPT | Mod: S$GLB,,, | Performed by: SURGERY

## 2023-03-21 PROCEDURE — 77080 DEXA BONE DENSITY SPINE HIP: ICD-10-PCS | Mod: 26,,, | Performed by: RADIOLOGY

## 2023-03-21 PROCEDURE — 99999 PR PBB SHADOW E&M-EST. PATIENT-LVL III: CPT | Mod: PBBFAC,,, | Performed by: SURGERY

## 2023-03-21 PROCEDURE — 1159F PR MEDICATION LIST DOCUMENTED IN MEDICAL RECORD: ICD-10-PCS | Mod: CPTII,S$GLB,, | Performed by: SURGERY

## 2023-03-21 PROCEDURE — 77080 DXA BONE DENSITY AXIAL: CPT | Mod: TC

## 2023-03-21 PROCEDURE — 3008F BODY MASS INDEX DOCD: CPT | Mod: CPTII,S$GLB,, | Performed by: SURGERY

## 2023-03-21 PROCEDURE — 3008F PR BODY MASS INDEX (BMI) DOCUMENTED: ICD-10-PCS | Mod: CPTII,S$GLB,, | Performed by: SURGERY

## 2023-03-21 PROCEDURE — 3079F PR MOST RECENT DIASTOLIC BLOOD PRESSURE 80-89 MM HG: ICD-10-PCS | Mod: CPTII,S$GLB,, | Performed by: SURGERY

## 2023-03-21 PROCEDURE — 3079F DIAST BP 80-89 MM HG: CPT | Mod: CPTII,S$GLB,, | Performed by: SURGERY

## 2023-03-21 PROCEDURE — 1159F MED LIST DOCD IN RCRD: CPT | Mod: CPTII,S$GLB,, | Performed by: SURGERY

## 2023-03-21 PROCEDURE — 3075F PR MOST RECENT SYSTOLIC BLOOD PRESS GE 130-139MM HG: ICD-10-PCS | Mod: CPTII,S$GLB,, | Performed by: SURGERY

## 2023-05-08 ENCOUNTER — PATIENT MESSAGE (OUTPATIENT)
Dept: SURGERY | Facility: CLINIC | Age: 53
End: 2023-05-08
Payer: MEDICARE

## 2023-05-16 NOTE — PROGRESS NOTES
Breast Surgery Clinic  [facility]  Department of Surgery    Referring Provider: No referring provider defined for this encounter.    Chief Complaint: Breast Cyst (R axilla cyst)      Subjective:      Patient ID: Allegra Reese is a 53 y.o. female who presents with     Patient has a history of multifocal hormone positive left breast cancer. She underwent bilateral mastectomy and left ALND on 4/27/2016 with Dr. Baeza. Final pathology revealed IDC (11 mm, 5 mm, and 3 mm) and 5 lymph nodes positive for metastatic carcinoma. She underwent adjuvant chemotherapy with AC x4 and Paclitaxel x4 on 6/24/2016. Does report undergoing adjuvant XRT of the left chest. She was then started on Zoladex and Anastrozole. Her post-operative course was complicated by PEs in 2017    Patient reports palpating mass of the right axilla beginning in January. She was evaluated by Dr. Lucas who recommended evaluation due to pain and discomfort. Prior to visit she developed new onset of redness, swelling and increased pain. She was seen in clinic by JOLEEN Ross NP on 2/22/2023 and fine needle aspiration was done with 5 cc of purulent material evacuated. Cultures were positive for Anaerococcus vaginalis and Diphtheroids. Patient was started on Bactrim as well and reports complete resolution of symptoms. Patient reports today for surgical excision to prevent further recurrence.     PMH:   Past Medical History:   Diagnosis Date    Anxiety     Breast cancer 2016    left    Breast cyst     Cancer     Depression     History of psychiatric hospitalization     [facility] in [place] 2014 due to suicidality    Hx of psychiatric care     Celexa, Wellbutrin, Topamax    Hypertension     Psychiatric exam requested by authority     Psychiatric problem     PTSD (post-traumatic stress disorder)     Sleep difficulties     Therapy      Past Surgical History:   Past Surgical History:   Procedure Laterality Date    BREAST BIOPSY Left 2016    BREAST  DC instructions RECONSTRUCTION Bilateral 2016    supriya flap reconstruction bilat    CYST REMOVAL Left     breast    HYSTERECTOMY      MASTECTOMY Bilateral 2016    OOPHORECTOMY      SHOULDER SURGERY Right     TOTAL REDUCTION MAMMOPLASTY Bilateral      Social History:  Social History     Socioeconomic History    Marital status:      Spouse name: Chance    Number of children: 3   Tobacco Use    Smoking status: Never    Smokeless tobacco: Never   Substance and Sexual Activity    Alcohol use: Yes     Alcohol/week: 0.0 standard drinks     Comment: socially    Drug use: No   Social History Narrative     (Chance), 3 sons, 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 30 tablet 2    canagliflozin (INVOKANA ORAL) Invokana      insulin degludec (TRESIBA FLEXTOUCH U-100) 100 unit/mL (3 mL) insulin pen Tresiba FlexTouch U-100      metoprolol succinate (TOPROL-XL) 25 MG 24 hr tablet TK 1 T PO QD  6    tirzepatide (MOUNJARO SUBQ) Mounjaro      XARELTO 20 mg Tab Take 20 mg by mouth once daily.  12    ALPRAZolam (XANAX) 0.5 MG tablet alprazolam 0.5 mg tablet   Take 1 tablet 3 times a day by oral route.      atorvastatin (LIPITOR) 20 MG tablet Take 20 mg by mouth.  3    benzonatate (TESSALON) 200 MG capsule TK 1 C PO TID COU  1    cyclobenzaprine (FLEXERIL) 10 MG tablet       gabapentin (NEURONTIN) 300 MG capsule gabapentin      glimepiride (AMARYL) 2 MG tablet Take 2 mg by mouth 2 (two) times daily.      lisinopril 10 MG tablet       meclizine (ANTIVERT) 25 mg tablet Take 25 mg by mouth.  0  "   metFORMIN (GLUCOPHAGE) 1000 MG tablet Take 1 tablet (1,000 mg total) by mouth 2 (two) times daily. 60 tablet 1    mupirocin (BACTROBAN) 2 % ointment Apply to affected area 3 times daily 22 g 1    pregabalin (LYRICA) 75 MG capsule       rosuvastatin (CRESTOR) 20 MG tablet Take 20 mg by mouth nightly.      traMADol (ULTRAM) 50 mg tablet tramadol 50 mg tablet   Take 1 tablet every 6 hours by oral route.      TRULICITY 1.5 mg/0.5 mL pen injector inject 0.5ml (1.5mg) subcutaneously EVERY 7 DAYS      XOFLUZA 40 mg tablet       zolpidem (AMBIEN) 10 mg Tab TAKE ONE TABLET BY MOUTH ONCE DAILY AT BEDTIME if needed FOR INSOMNIA  1     No current facility-administered medications on file prior to visit.       Review of Systems   Constitutional:  Negative for chills, fatigue and fever.   Eyes:  Negative for visual disturbance.   Respiratory:  Negative for cough, shortness of breath and wheezing.    Cardiovascular:  Negative for chest pain and palpitations.   Musculoskeletal:  Negative for back pain.   Skin:  Negative for pallor, rash and wound.     Objective:   /80 (BP Location: Right arm, Patient Position: Sitting, BP Method: Large (Automatic))   Pulse 84   Ht 5' 9" (1.753 m)   Wt 111 kg (244 lb 11.4 oz)   BMI 36.14 kg/m²     Physical Exam   Vitals reviewed.  Constitutional: She is oriented to person, place, and time.   HENT:   Head: Normocephalic and atraumatic.   Eyes: Conjunctivae are normal. Right eye exhibits no discharge. Left eye exhibits no discharge. No scleral icterus.   Cardiovascular:  Normal rate, regular rhythm and normal pulses.            Pulmonary/Chest: Effort normal and breath sounds normal. No accessory muscle usage or stridor. No respiratory distress. She has no wheezes. Right breast exhibits skin change. Right breast exhibits no inverted nipple, no mass, no nipple discharge and no tenderness. Left breast exhibits no inverted nipple, no mass, no nipple discharge, no skin change and no " tenderness.       Abdominal: Soft. Normal appearance. She exhibits no mass.   Musculoskeletal: Normal range of motion. No deformity. Lymphadenopathy:      Cervical: No cervical adenopathy.     Neurological: She is alert and oriented to person, place, and time.   Skin: Skin is warm and dry. No pallor.     Psychiatric: Mood and judgment normal.       Assessment:       1. Sebaceous cyst of axilla    2. Carcinoma of axillary tail of left breast in female, estrogen receptor positive        Plan:   We discussed management of recurrent sebaceous cyst  The patient would like to proceed with cyst excision. The operative risks of bleeding, infection, recurrence, scarring, and anesthetic complications and the possibility of requiring further surgery were all noted and informed consent obtained.    Surgery will be scheduled in small procedure room. Follow-up in clinic roughly 14 days after surgery.    No evidence of disease    Total time spent with the patient: 25 minutes.  15 minutes of face to face consultation and 10 minutes of chart review and coordination of care.

## 2023-05-17 ENCOUNTER — OFFICE VISIT (OUTPATIENT)
Dept: HEMATOLOGY/ONCOLOGY | Facility: CLINIC | Age: 53
End: 2023-05-17
Payer: COMMERCIAL

## 2023-05-17 VITALS
SYSTOLIC BLOOD PRESSURE: 116 MMHG | OXYGEN SATURATION: 97 % | DIASTOLIC BLOOD PRESSURE: 72 MMHG | WEIGHT: 239.44 LBS | RESPIRATION RATE: 18 BRPM | HEART RATE: 77 BPM | BODY MASS INDEX: 35.46 KG/M2 | HEIGHT: 69 IN

## 2023-05-17 DIAGNOSIS — Z17.0 CARCINOMA OF AXILLARY TAIL OF LEFT BREAST IN FEMALE, ESTROGEN RECEPTOR POSITIVE: Primary | ICD-10-CM

## 2023-05-17 DIAGNOSIS — C50.612 CARCINOMA OF AXILLARY TAIL OF LEFT BREAST IN FEMALE, ESTROGEN RECEPTOR POSITIVE: Primary | ICD-10-CM

## 2023-05-17 DIAGNOSIS — I89.0 LYMPHEDEMA: ICD-10-CM

## 2023-05-17 DIAGNOSIS — I26.99 PULMONARY EMBOLISM, OTHER, UNSPECIFIED CHRONICITY, UNSPECIFIED WHETHER ACUTE COR PULMONALE PRESENT: ICD-10-CM

## 2023-05-17 DIAGNOSIS — Z79.811 USE OF AROMATASE INHIBITORS: ICD-10-CM

## 2023-05-17 PROCEDURE — 99999 PR PBB SHADOW E&M-EST. PATIENT-LVL III: CPT | Mod: PBBFAC,,, | Performed by: INTERNAL MEDICINE

## 2023-05-17 PROCEDURE — 99999 PR PBB SHADOW E&M-EST. PATIENT-LVL III: ICD-10-PCS | Mod: PBBFAC,,, | Performed by: INTERNAL MEDICINE

## 2023-05-17 PROCEDURE — 3074F PR MOST RECENT SYSTOLIC BLOOD PRESSURE < 130 MM HG: ICD-10-PCS | Mod: CPTII,S$GLB,, | Performed by: INTERNAL MEDICINE

## 2023-05-17 PROCEDURE — 3078F PR MOST RECENT DIASTOLIC BLOOD PRESSURE < 80 MM HG: ICD-10-PCS | Mod: CPTII,S$GLB,, | Performed by: INTERNAL MEDICINE

## 2023-05-17 PROCEDURE — 3074F SYST BP LT 130 MM HG: CPT | Mod: CPTII,S$GLB,, | Performed by: INTERNAL MEDICINE

## 2023-05-17 PROCEDURE — 3008F PR BODY MASS INDEX (BMI) DOCUMENTED: ICD-10-PCS | Mod: CPTII,S$GLB,, | Performed by: INTERNAL MEDICINE

## 2023-05-17 PROCEDURE — 1159F MED LIST DOCD IN RCRD: CPT | Mod: CPTII,S$GLB,, | Performed by: INTERNAL MEDICINE

## 2023-05-17 PROCEDURE — 3078F DIAST BP <80 MM HG: CPT | Mod: CPTII,S$GLB,, | Performed by: INTERNAL MEDICINE

## 2023-05-17 PROCEDURE — 3008F BODY MASS INDEX DOCD: CPT | Mod: CPTII,S$GLB,, | Performed by: INTERNAL MEDICINE

## 2023-05-17 PROCEDURE — 99215 OFFICE O/P EST HI 40 MIN: CPT | Mod: S$GLB,,, | Performed by: INTERNAL MEDICINE

## 2023-05-17 PROCEDURE — 99215 PR OFFICE/OUTPT VISIT, EST, LEVL V, 40-54 MIN: ICD-10-PCS | Mod: S$GLB,,, | Performed by: INTERNAL MEDICINE

## 2023-05-17 PROCEDURE — 1159F PR MEDICATION LIST DOCUMENTED IN MEDICAL RECORD: ICD-10-PCS | Mod: CPTII,S$GLB,, | Performed by: INTERNAL MEDICINE

## 2023-05-17 NOTE — PROGRESS NOTES
Subjective:       Patient ID: Allegra Reese is a 53 y.o. female.    Chief Complaint: No chief complaint on file.      HPI     Mrs. Reese returns today for follow up.  I had last seen her in mid January 2023.     Briefly, she is a 53-year-old  female from [place] who was diagnosed with breast cancer and on 04/27/2016 underwent bilateral mastectomies with reconstruction.   There was no invasive cancer or DCIS in the right breast; in the left breast she had three areas of an infiltrating ductal carcinoma measuring 11 mm, 5 mm, and 3 mm respectively.  One sentinel lymph node was positive and a full axillary dissection was performed with a total of five lymph nodes being positive.  She was tested and found to be negative for BRCA mutations.  Of note, her cancer was strongly ER and TN positive and HER 2 megative  She started adjuvant chemotherapy on June 24th 2016, and completed 4 cycles of AC and four cycles of paclitaxel.   She subsequently received XRT and was started on zoladex plus anastrazole.  In late January 2017 she underwent laparoscopic oophorectomies and Zoladex was discontinued.  Two weeks after undergoing reconstruction in 2017 she had presented to the ED with SOB and was found to have PEs.  She was started on rivaroxaban, and is currently on 20 mg QHS.   Her DXA scan in March 2023 had shown normal bone density.    Review of Systems    Overall today she feels OK.  Other than hot flashes, she has no complaints.   Her  ECOG PS remains 1.  She denies any easy bruising, fevers, chills, night  sweats, weight loss, nausea, vomiting, diarrhea, constipation, diplopia, blurred vision, headaches, chest pain, palpitations, shortness of breath, breast pain, or difficulty ambulating.  The remainder of the ten-point ROS, including general, skin, lymph, H/N, cardiorespiratory, GI, , Neuro, Endocrine, and psychiatric is negative.       Objective:      Physical Exam    She is alert, oriented to time,  place, person, pleasant, well      nourished, in no acute physical distress.                                VITAL SIGNS:  Reviewed                                      HEENT: normal.  There are no nasal, oral, lip, gingival, auricular, lid,    or conjunctival lesions.  Mucosae are moist and pink, and there is no        thrush.  Pupils are equal, reactive to light and accommodation.              Extraocular muscle movements are intact.    Dentition is good.                                     NECK:  Supple without JVD, adenopathy, or thyromegaly.                       LUNGS:  Clear to auscultation without wheezing, rales, or rhonchi.           CARDIOVASCULAR:  Reveals an S1, S2, no murmurs, no rubs, no gallops.         ABDOMEN:  Soft, nontender, without organomegaly.  Bowel sounds are    present.   The abdominal incision has healed.                                                                 EXTREMITIES:  No cyanosis, clubbing, or edema.                               BREASTS:  She is status post bilateral mastectomies with free flap reconstructions.  The incisions have healed nicely.    There is an area of fat necrosis at the 9 o' clock position in the right reconstructed breast.   This has been noted previously.                       LYMPHATIC:  There is no cervical, left axillary, or supraclavicular adenopathy.  There is a small superficial lump on the skin in the right axilla that most likely represents a sebaceous cyst.  It is slightly tender to palpation.  SKIN:  Warm and moist, without petechiae, rashes, induration, or ecchymoses.  There is mild hyperpigmentation within the radiation field on the left chest wall.         NEUROLOGIC:  DTRs are 0-1+ bilaterally, symmetrical, motor function is 5/5,  and cranial nerves are  within normal limits.       Assessment:       1. 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 Use of aromatase inhibitors    3. Pulmonary embolism,  other, unspecified chronicity, unspecified whether acute cor pulmonale present    4. Lymphedema     3.     PE, possibly related to the administration of AIs.   4.      Type 2 diabetes    Plan:          I had a long discussion with her;  She will remain on anastrazole for now, and she will complete her 7 years in December 2023.    In regards to her PE, even though it was provoked, since she is on AIs, I would prefer that she remain on rivaroxaban for the whole time that she is on arimidex.  She can probably discontinue it by the end of February 2024.  I will see her again in 4 months.  Her questions were answered to her satisfaction.    Route Chart for Scheduling    Med Onc Chart Routing      Follow up with physician 4 months.   Follow up with [facility]    Infusion scheduling note    Injection scheduling note    Labs    Imaging    Pharmacy appointment    Other referrals

## 2023-06-09 ENCOUNTER — PATIENT MESSAGE (OUTPATIENT)
Dept: HEMATOLOGY/ONCOLOGY | Facility: CLINIC | Age: 53
End: 2023-06-09
Payer: MEDICARE

## 2023-06-09 DIAGNOSIS — Z79.811 PROPHYLACTIC USE OF ANASTROZOLE (ARIMIDEX): ICD-10-CM

## 2023-06-09 RX ORDER — ANASTROZOLE 1 MG/1
1 TABLET ORAL DAILY
Qty: 30 TABLET | Refills: 2 | Status: SHIPPED | OUTPATIENT
Start: 2023-06-09 | End: 2023-11-14

## 2023-09-11 ENCOUNTER — PATIENT MESSAGE (OUTPATIENT)
Dept: HEMATOLOGY/ONCOLOGY | Facility: CLINIC | Age: 53
End: 2023-09-11
Payer: MEDICARE

## 2023-09-18 ENCOUNTER — OFFICE VISIT (OUTPATIENT)
Dept: HEMATOLOGY/ONCOLOGY | Facility: CLINIC | Age: 53
End: 2023-09-18
Payer: COMMERCIAL

## 2023-09-18 VITALS
BODY MASS INDEX: 34.48 KG/M2 | TEMPERATURE: 98 F | SYSTOLIC BLOOD PRESSURE: 124 MMHG | HEART RATE: 77 BPM | OXYGEN SATURATION: 99 % | DIASTOLIC BLOOD PRESSURE: 81 MMHG | HEIGHT: 69 IN | WEIGHT: 232.81 LBS

## 2023-09-18 DIAGNOSIS — C50.612 CARCINOMA OF AXILLARY TAIL OF LEFT BREAST IN FEMALE, ESTROGEN RECEPTOR POSITIVE: Primary | ICD-10-CM

## 2023-09-18 DIAGNOSIS — Z79.811 USE OF AROMATASE INHIBITORS: ICD-10-CM

## 2023-09-18 DIAGNOSIS — Z17.0 CARCINOMA OF AXILLARY TAIL OF LEFT BREAST IN FEMALE, ESTROGEN RECEPTOR POSITIVE: Primary | ICD-10-CM

## 2023-09-18 PROCEDURE — 3079F PR MOST RECENT DIASTOLIC BLOOD PRESSURE 80-89 MM HG: ICD-10-PCS | Mod: CPTII,S$GLB,, | Performed by: INTERNAL MEDICINE

## 2023-09-18 PROCEDURE — 3074F PR MOST RECENT SYSTOLIC BLOOD PRESSURE < 130 MM HG: ICD-10-PCS | Mod: CPTII,S$GLB,, | Performed by: INTERNAL MEDICINE

## 2023-09-18 PROCEDURE — 99215 PR OFFICE/OUTPT VISIT, EST, LEVL V, 40-54 MIN: ICD-10-PCS | Mod: S$GLB,,, | Performed by: INTERNAL MEDICINE

## 2023-09-18 PROCEDURE — 1159F PR MEDICATION LIST DOCUMENTED IN MEDICAL RECORD: ICD-10-PCS | Mod: CPTII,S$GLB,, | Performed by: INTERNAL MEDICINE

## 2023-09-18 PROCEDURE — 99215 OFFICE O/P EST HI 40 MIN: CPT | Mod: S$GLB,,, | Performed by: INTERNAL MEDICINE

## 2023-09-18 PROCEDURE — 99999 PR PBB SHADOW E&M-EST. PATIENT-LVL III: CPT | Mod: PBBFAC,,, | Performed by: INTERNAL MEDICINE

## 2023-09-18 PROCEDURE — 3008F BODY MASS INDEX DOCD: CPT | Mod: CPTII,S$GLB,, | Performed by: INTERNAL MEDICINE

## 2023-09-18 PROCEDURE — 3008F PR BODY MASS INDEX (BMI) DOCUMENTED: ICD-10-PCS | Mod: CPTII,S$GLB,, | Performed by: INTERNAL MEDICINE

## 2023-09-18 PROCEDURE — 1159F MED LIST DOCD IN RCRD: CPT | Mod: CPTII,S$GLB,, | Performed by: INTERNAL MEDICINE

## 2023-09-18 PROCEDURE — 99999 PR PBB SHADOW E&M-EST. PATIENT-LVL III: ICD-10-PCS | Mod: PBBFAC,,, | Performed by: INTERNAL MEDICINE

## 2023-09-18 PROCEDURE — 3074F SYST BP LT 130 MM HG: CPT | Mod: CPTII,S$GLB,, | Performed by: INTERNAL MEDICINE

## 2023-09-18 PROCEDURE — 3079F DIAST BP 80-89 MM HG: CPT | Mod: CPTII,S$GLB,, | Performed by: INTERNAL MEDICINE

## 2023-09-18 NOTE — PROGRESS NOTES
Subjective:       Patient ID: Allegra Reese is a 53 y.o. female.    Chief Complaint: No chief complaint on file.      HPI     Mrs. Reese returns today for follow up.  I had last seen her in mid May 2023.     Briefly, she is a 53-year-old  female from [place] who was diagnosed with breast cancer and on 04/27/2016 underwent bilateral mastectomies with reconstruction.   There was no invasive cancer or DCIS in the right breast; in the left breast she had three areas of an infiltrating ductal carcinoma measuring 11 mm, 5 mm, and 3 mm respectively.  One sentinel lymph node was positive and a full axillary dissection was performed with a total of five lymph nodes being positive.  She was tested and found to be negative for BRCA mutations.  Of note, her cancer was strongly ER and NH positive and HER 2 megative  She started adjuvant chemotherapy on June 24th 2016, and completed 4 cycles of AC and four cycles of paclitaxel.   She subsequently received XRT and was started on zoladex plus anastrazole.  In late January 2017 she underwent laparoscopic oophorectomies and Zoladex was discontinued.  Two weeks after undergoing reconstruction in 2017 she had presented to the ED with SOB and was found to have PEs.  She was started on rivaroxaban, and is currently on 20 mg QHS.   Her DXA scan in March 2023 had shown normal bone density.    Review of Systems    Overall today she feels OK.  Other than hot flashes, she has no complaints.   Her  ECOG PS remains 1.  She denies any easy bruising, fevers, chills, night  sweats, weight loss, nausea, vomiting, diarrhea, constipation, diplopia, blurred vision, headaches, chest pain, palpitations, shortness of breath, breast pain, or difficulty ambulating.  The remainder of the ten-point ROS, including general, skin, lymph, H/N, cardiorespiratory, GI, , Neuro, Endocrine, and psychiatric is negative.       Objective:      Physical Exam    She is alert, oriented to time, place,  person, pleasant, well      nourished, in no acute physical distress.                                VITAL SIGNS:  Reviewed                                      HEENT: normal.  There are no nasal, oral, lip, gingival, auricular, lid,    or conjunctival lesions.  Mucosae are moist and pink, and there is no        thrush.  Pupils are equal, reactive to light and accommodation.              Extraocular muscle movements are intact.    Dentition is good.                                     NECK:  Supple without JVD, adenopathy, or thyromegaly.                       LUNGS:  Clear to auscultation without wheezing, rales, or rhonchi.           CARDIOVASCULAR:  Reveals an S1, S2, no murmurs, no rubs, no gallops.         ABDOMEN:  Soft, nontender, without organomegaly.  Bowel sounds are    present.   The abdominal incision has healed.                                                                 EXTREMITIES:  No cyanosis, clubbing, or edema.                               BREASTS:  She is status post bilateral mastectomies with free flap reconstructions.  The incisions have healed nicely.    There is an area of fat necrosis at the 9 o' clock position in the right reconstructed breast.   This has been noted previously.   There was also a 5 mm rather superficial hard nodule in the cyril areolar area in the left reconstruction at the 10 o'clock position.                    LYMPHATIC:  There is no cervical, left axillary, or supraclavicular adenopathy.    SKIN:  Warm and moist, without petechiae, rashes, induration, or ecchymoses.  There is mild hyperpigmentation within the radiation field on the left chest wall.         NEUROLOGIC:  DTRs are 0-1+ bilaterally, symmetrical, motor function is 5/5,  and cranial nerves are  within normal limits.       Assessment:       1. 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 Use of aromatase inhibitors     3.     PE, possibly related to the  administration of AIs.    4.     Small superficial nodule, left reconstruction, as described above   5.     Type 2 diabetes    Plan:          I had a long discussion with her;  She will remain on anastrazole for now, and she will complete her 7 years in December 2023.    In regards to the palpable superficial nodule in the left reconstruction, we will proceed with an ultrasound to better evaluate it.  We will arrange.  In regards to her PE, even though it was provoked, since she is on AIs, I would prefer that she remain on rivaroxaban for the whole time that she is on arimidex.  She can probably discontinue it by the end of February 2024.  I will see her again in 12 months.  Her questions were answered to her satisfaction.    Route Chart for Scheduling    Med Onc Chart Routing  Urgent    Follow up with physician 1 year. Needs a left breast ultrasound ASAP.  See me in a year   Follow up with [facility]    Infusion scheduling note    Injection scheduling note    Labs    Imaging ECHO   Left breast ultrasound ASAP please   Pharmacy appointment    Other referrals

## 2023-09-18 NOTE — LETTER
September 18, 2023      [facility] Hem [facility]c 3rd Fl  [address] [place] [id number]  Phone: [phone]       Patient: Allegra Reese   YOB: 1970  Date of Visit: 09/18/2023    To Whom It May Concern:    Minor Reese  was at [facility] on 09/18/2023. The patient may return to work/school on 09/18/2023  with no restrictions. If you have any questions or concerns, or if I can be of further assistance, please do not hesitate to contact me.    Sincerely,

## 2023-09-21 ENCOUNTER — HOSPITAL ENCOUNTER (OUTPATIENT)
Dept: RADIOLOGY | Facility: OTHER | Age: 53
Discharge: HOME | End: 2023-09-21
Attending: INTERNAL MEDICINE
Payer: COMMERCIAL

## 2023-09-21 DIAGNOSIS — C50.612 CARCINOMA OF AXILLARY TAIL OF LEFT BREAST IN FEMALE, ESTROGEN RECEPTOR POSITIVE: ICD-10-CM

## 2023-09-21 DIAGNOSIS — Z17.0 CARCINOMA OF AXILLARY TAIL OF LEFT BREAST IN FEMALE, ESTROGEN RECEPTOR POSITIVE: ICD-10-CM

## 2023-09-21 PROCEDURE — 76642 US BREAST LEFT LIMITED: ICD-10-PCS | Mod: 26,LT,, | Performed by: RADIOLOGY

## 2023-09-21 PROCEDURE — 76642 ULTRASOUND BREAST LIMITED: CPT | Mod: TC,LT

## 2023-09-21 PROCEDURE — 76642 ULTRASOUND BREAST LIMITED: CPT | Mod: 26,LT,, | Performed by: RADIOLOGY

## 2023-11-14 DIAGNOSIS — Z79.811 PROPHYLACTIC USE OF ANASTROZOLE (ARIMIDEX): ICD-10-CM

## 2023-11-14 RX ORDER — ANASTROZOLE 1 MG/1
1 TABLET ORAL DAILY
Qty: 30 TABLET | Refills: 2 | Status: SHIPPED | OUTPATIENT
Start: 2023-11-14 | End: 2024-11-13

## 2024-09-25 ENCOUNTER — OFFICE VISIT (OUTPATIENT)
Dept: HEMATOLOGY/ONCOLOGY | Facility: CLINIC | Age: 54
End: 2024-09-25
Payer: COMMERCIAL

## 2024-09-25 VITALS
TEMPERATURE: 97 F | SYSTOLIC BLOOD PRESSURE: 156 MMHG | BODY MASS INDEX: 35.92 KG/M2 | DIASTOLIC BLOOD PRESSURE: 87 MMHG | HEART RATE: 79 BPM | WEIGHT: 242.5 LBS | OXYGEN SATURATION: 99 % | HEIGHT: 69 IN

## 2024-09-25 DIAGNOSIS — I26.09 CHRONIC PULMONARY EMBOLISM WITH ACUTE COR PULMONALE, UNSPECIFIED PULMONARY EMBOLISM TYPE: ICD-10-CM

## 2024-09-25 DIAGNOSIS — I27.82 CHRONIC PULMONARY EMBOLISM WITH ACUTE COR PULMONALE, UNSPECIFIED PULMONARY EMBOLISM TYPE: ICD-10-CM

## 2024-09-25 DIAGNOSIS — C50.612 CARCINOMA OF AXILLARY TAIL OF LEFT BREAST IN FEMALE, ESTROGEN RECEPTOR POSITIVE: Primary | ICD-10-CM

## 2024-09-25 DIAGNOSIS — Z17.0 CARCINOMA OF AXILLARY TAIL OF LEFT BREAST IN FEMALE, ESTROGEN RECEPTOR POSITIVE: Primary | ICD-10-CM

## 2024-09-25 PROCEDURE — 3008F BODY MASS INDEX DOCD: CPT | Mod: CPTII,S$GLB,, | Performed by: INTERNAL MEDICINE

## 2024-09-25 PROCEDURE — 3079F DIAST BP 80-89 MM HG: CPT | Mod: CPTII,S$GLB,, | Performed by: INTERNAL MEDICINE

## 2024-09-25 PROCEDURE — 99999 PR PBB SHADOW E&M-EST. PATIENT-LVL III: CPT | Mod: PBBFAC,,, | Performed by: INTERNAL MEDICINE

## 2024-09-25 PROCEDURE — 99214 OFFICE O/P EST MOD 30 MIN: CPT | Mod: S$GLB,,, | Performed by: INTERNAL MEDICINE

## 2024-09-25 PROCEDURE — 3077F SYST BP >= 140 MM HG: CPT | Mod: CPTII,S$GLB,, | Performed by: INTERNAL MEDICINE

## 2024-09-25 NOTE — LETTER
September 25, 2024      [facility] Hem [facility]c 3rd [facility]  [address]  [facility] [address]  Phone: [phone]       Patient: Allegra Reese   YOB: 1970  Date of Visit: 09/25/2024    To Whom It May Concern:    Minor Reese  was at [facility] on 09/25/2024. The patient may return to work/school on 09/25/2024 with no restrictions. If you have any questions or concerns, or if I can be of further assistance, please do not hesitate to contact me.    Sincerely,          Izzy Cardoza RN

## 2024-09-25 NOTE — PROGRESS NOTES
Subjective:       Patient ID: Allegra Reese is a 54 y.o. female.    Chief Complaint: No chief complaint on file.      HPI     Mrs. Reese returns today for follow up.  I had last seen her in September 18, 2023.     Briefly, she is a 54-year-old  female from [place] who was diagnosed with breast cancer and on 04/27/2016 underwent bilateral mastectomies with reconstruction.   There was no invasive cancer or DCIS in the right breast; in the left breast she had three areas of an infiltrating ductal carcinoma measuring 11 mm, 5 mm, and 3 mm respectively.  One sentinel lymph node was positive and a full axillary dissection was performed with a total of five lymph nodes being positive.  She was tested and found to be negative for BRCA mutations.  Of note, her cancer was strongly ER and CT positive and HER 2 megative  She started adjuvant chemotherapy on June 24th 2016, and completed 4 cycles of AC and four cycles of paclitaxel.   She subsequently received XRT and was started on zoladex plus anastrazole.  In late January 2017 she underwent laparoscopic oophorectomies and Zoladex was discontinued.  She completed 7 years of adjuvant hormonal therapy with anastrazole in December 2023.  She remained on rivaroxaban until March of 2024 and subsequently discontinued it.    Two weeks after undergoing reconstruction in 2017 she had presented to the ED with SOB and was found to have PEs.  She was started on rivaroxaban, and is currently on 20 mg QHS.   Her DXA scan in March 2023 had shown normal bone density.    Review of Systems    Overall today she feels OK.  Other than hot flashes, she has no complaints.   Her  ECOG PS remains 1.  She denies any easy bruising, fevers, chills, night  sweats, weight loss, nausea, vomiting, diarrhea, constipation, diplopia, blurred vision, headaches, chest pain, palpitations, shortness of breath, breast pain, or difficulty ambulating.  The remainder of the ten-point ROS, including  general, skin, lymph, H/N, cardiorespiratory, GI, , Neuro, Endocrine, and psychiatric is negative.       Objective:      Physical Exam    She is alert, oriented to time, place, person, pleasant, well      nourished, in no acute physical distress.                                VITAL SIGNS:  Reviewed                                      HEENT: normal.  There are no nasal, oral, lip, gingival, auricular, lid,    or conjunctival lesions.  Mucosae are moist and pink, and there is no        thrush.  Pupils are equal, reactive to light and accommodation.              Extraocular muscle movements are intact.    Dentition is good.                                     NECK:  Supple without JVD, adenopathy, or thyromegaly.                       LUNGS:  Clear to auscultation without wheezing, rales, or rhonchi.           CARDIOVASCULAR:  Reveals an S1, S2, no murmurs, no rubs, no gallops.         ABDOMEN:  Soft, nontender, without organomegaly.  Bowel sounds are    present.   The abdominal incision has healed.                                                                 EXTREMITIES:  No cyanosis, clubbing, or edema.                               BREASTS:  She is status post bilateral mastectomies with free flap reconstructions.  The incisions have healed nicely.    There is an area of fat necrosis at the 9 o' clock position in the right reconstructed breast.   This has been noted previously.   There was also a 5 mm rather superficial hard nodule in the cyril areolar area in the left reconstruction at the 10 o'clock position.                    LYMPHATIC:  There is no cervical, left axillary, or supraclavicular adenopathy.    SKIN:  Warm and moist, without petechiae, rashes, induration, or ecchymoses.  There is mild hyperpigmentation within the radiation field on the left chest wall.         NEUROLOGIC:  DTRs are 0-1+ bilaterally, symmetrical, motor function is 5/5,  and cranial nerves are  within normal limits.        Assessment:       1. 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     PE, possibly related to the administration of AIs.    4.     Small superficial nodule, left reconstruction, as described above   5.     Type 2 diabetes    Plan:          I had a long discussion with her;  She completed her 7 years of adjuvant hormonal therapy in December 2023.    She also discontinued the Xarelto in March of 2024.  She is doing well and remains in remission.  I will see her again in 12 months.  Her questions were answered to her satisfaction.    Route Chart for Scheduling    Med Onc Chart Routing      Follow up with physician 1 year.   Follow up with [facility]    Infusion scheduling note    Injection scheduling note    Labs    Imaging    Pharmacy appointment    Other referrals

## 2024-12-15 ENCOUNTER — HOSPITAL ENCOUNTER (EMERGENCY)
Facility: HOSPITAL | Age: 54
Discharge: HOME | End: 2024-12-15
Attending: FAMILY MEDICINE
Payer: COMMERCIAL

## 2024-12-15 VITALS
BODY MASS INDEX: 35.98 KG/M2 | HEART RATE: 94 BPM | SYSTOLIC BLOOD PRESSURE: 126 MMHG | DIASTOLIC BLOOD PRESSURE: 80 MMHG | WEIGHT: 242.94 LBS | OXYGEN SATURATION: 98 % | TEMPERATURE: 98 F | HEIGHT: 69 IN | RESPIRATION RATE: 20 BRPM

## 2024-12-15 DIAGNOSIS — R73.9 HYPERGLYCEMIA: Primary | ICD-10-CM

## 2024-12-15 LAB — POCT GLUCOSE: 155 MG/DL (ref 70–110)

## 2024-12-15 PROCEDURE — 82962 GLUCOSE BLOOD TEST: CPT

## 2024-12-15 PROCEDURE — 99282 EMERGENCY DEPT VISIT SF MDM: CPT

## 2024-12-15 NOTE — ED TRIAGE NOTES
Patient with c/o neuropathy. Patient reports her blood sugar has been running high. Accu check 155 mg/dl in triage.

## 2024-12-15 NOTE — DISCHARGE INSTRUCTIONS
Please follow up with your primary care physician within 2 days. Ensure that you review all lab work results and/or imaging results. If you have any questions about your discharge paperwork please call the Emergency Department.    Return to the ED for any fevers, nausea, vomiting, abdominal pain, diarrhea, inability to take food or water by mouth without vomiting, or any new or worsening symptoms.       If you were prescribed antibiotics, please take them to completion. If you were prescribed a narcotic or any sedating medication, do not drive or operate heavy equipment or machinery while taking these medications.  If you were diagnosed with a seizure, syncope, any loss of consciousness or decreased alertness, do not drive, swim, operate heavy machinery, or put yourself in any position where a sudden loss of consciousness could put yourself or others in danger.    Thank you for visiting [facility], Department of Emergency Medicine. Please see the entirety of the educational materials provided. Please note that a visit to the emergency department does not substitute ongoing care from a primary medical provider or specialist. Please ensure to follow up as recommended. However, please return to the emergency department immediately if symptoms do not improve as discussed, symptoms worsen, new symptoms develop, difficulty in following up or for any of your concerns or issues. Please note on discharge you are acknowledging understanding and agreement on medical evaluation, management recommendations and follow up recommendations.     Please follow up tomorrow with dr. Graf. Please track your sugars and bring your glucose monitor to your appointment.

## 2024-12-15 NOTE — Clinical Note
"Allegra "Kwaku Reese was seen and treated in our emergency department on 12/15/2024.  She may return to work on 12/17/2024.       If you have any questions or concerns, please don't hesitate to call.      Alyssa Miguel MD"

## 2024-12-16 NOTE — ED PROVIDER NOTES
Encounter Date: 12/15/2024       History     Chief Complaint   Patient presents with    Hyperglycemia     HPI  54-year-old female with a history of breast cancer, hypertension, diabetes presents to the ED with elevated blood glucose readings on Accu-Chek.  Patient states that she got a steroid prescription for bursitis however has not taken it in 48 hours.  Patient states that her medications have been working.  She has not been able to take her normal regimen as her insurance has changed.  Review of patient's allergies indicates:   Allergen Reactions    Adhesive      blisters    Jardiance [empagliflozin] Other (See Comments)     Yeast infections     Past Medical History:   Diagnosis Date    Anxiety     Breast cancer 2016    left    Breast cyst     Cancer     Depression     History of psychiatric hospitalization     [facility] in [place] 2014 due to suicidality    Hx of psychiatric care     Celexa, Wellbutrin, Topamax    Hypertension     Psychiatric exam requested by authority     Psychiatric problem     PTSD (post-traumatic stress disorder)     Sleep difficulties     Therapy      Past Surgical History:   Procedure Laterality Date    BREAST BIOPSY Left 2016    BREAST RECONSTRUCTION Bilateral 2016    supriya flap reconstruction bilat    CYST REMOVAL Left     breast    HYSTERECTOMY      MASTECTOMY Bilateral 2016    OOPHORECTOMY      SHOULDER SURGERY Right     TOTAL REDUCTION MAMMOPLASTY Bilateral      Family History   Problem Relation Name Age of Onset    Cancer Paternal Aunt      Breast cancer Paternal Aunt      Cancer Paternal Grandmother      Breast cancer Paternal Grandmother      Cancer Paternal Aunt      Breast cancer Other great aunt     No Known Problems Mother      Heart attack Father      Diabetes Father       Social History     Tobacco Use    Smoking status: Never    Smokeless tobacco: Never   Substance Use Topics    Alcohol use: Yes     Alcohol/week: 0.0 standard drinks of alcohol     Comment: socially    Drug  use: No     Review of Systems   Constitutional:  Negative for chills and fever.   Eyes:  Negative for visual disturbance.   Respiratory:  Negative for shortness of breath and wheezing.    Cardiovascular:  Negative for chest pain and palpitations.   Gastrointestinal:  Negative for diarrhea, nausea and vomiting.   Skin:  Negative for rash.   All other systems reviewed and are negative.      Physical Exam     Initial Vitals [12/15/24 1123]   BP Pulse Resp Temp SpO2   (!) 154/94 93 18 98.1 °F (36.7 °C) 99 %      MAP       --         Physical Exam    Constitutional: She appears well-developed and well-nourished. She is not diaphoretic. No distress.   HENT:   Head: Normocephalic and atraumatic.   Right Ear: External ear normal.   Left Ear: External ear normal.   Eyes: EOM are normal. Pupils are equal, round, and reactive to light.   Neck:   Normal range of motion.  Cardiovascular:  Normal rate and regular rhythm.           No murmur heard.  Pulmonary/Chest: Breath sounds normal. No respiratory distress. She has no wheezes.   Abdominal: Abdomen is soft. She exhibits no distension. There is no abdominal tenderness.   Musculoskeletal:      Cervical back: Normal range of motion.     Neurological: She is alert and oriented to person, place, and time. GCS score is 15. GCS eye subscore is 4. GCS verbal subscore is 5. GCS motor subscore is 6.   Skin: Skin is warm and dry.   Psychiatric: She has a normal mood and affect.         ED Course   Procedures  Labs Reviewed   POCT GLUCOSE - Abnormal       Result Value    POCT Glucose 155 (*)           Imaging Results    None          Medications - No data to display  Medical Decision Making  Differential diagnosis hyperglycemia    54-year-old female who presents to the ED with hyperglycemia.  Per report her Accu-Chek has been reading high however when compared to our glucose monitor we have a glucose of 155.  Vital signs reviewed by myself.  Had a long discussion with patient on  monitoring her p.o. intake of food.  She has changed her regimen for diabetes.  She states that she can not afford all of her medications.  She will follow-up tomorrow with her PCP to change her medication regimen strict return precautions discussed.  All questions answered prior to discharge                                        Clinical Impression:  Final diagnoses:  [R73.9] Hyperglycemia (Primary)          ED Disposition Condition    Discharge Stable          ED Prescriptions    None       Follow-up Information       Follow up With Specialties Details Why Contact Info    Anuj Graf Jr., MD Family Medicine Schedule an appointment as soon as possible for a visit in 1 day  [address]  [facility] [address]  [phone]               Alyssa Miguel MD  12/16/24 0622

## 2025-03-21 ENCOUNTER — PATIENT MESSAGE (OUTPATIENT)
Dept: HEMATOLOGY/ONCOLOGY | Facility: CLINIC | Age: 55
End: 2025-03-21
Payer: COMMERCIAL